# Patient Record
Sex: MALE | Race: WHITE | Employment: OTHER | URBAN - METROPOLITAN AREA
[De-identification: names, ages, dates, MRNs, and addresses within clinical notes are randomized per-mention and may not be internally consistent; named-entity substitution may affect disease eponyms.]

---

## 2017-02-02 ENCOUNTER — OFFICE VISIT (OUTPATIENT)
Dept: INTERNAL MEDICINE CLINIC | Age: 62
End: 2017-02-02

## 2017-02-02 VITALS
HEIGHT: 70 IN | WEIGHT: 173.6 LBS | BODY MASS INDEX: 24.85 KG/M2 | DIASTOLIC BLOOD PRESSURE: 82 MMHG | SYSTOLIC BLOOD PRESSURE: 134 MMHG | TEMPERATURE: 98.8 F | HEART RATE: 81 BPM

## 2017-02-02 DIAGNOSIS — E55.9 VITAMIN D DEFICIENCY: ICD-10-CM

## 2017-02-02 DIAGNOSIS — I10 HYPERTENSION, ESSENTIAL: Primary | ICD-10-CM

## 2017-02-02 DIAGNOSIS — Z11.4 SCREENING FOR HIV (HUMAN IMMUNODEFICIENCY VIRUS): ICD-10-CM

## 2017-02-02 DIAGNOSIS — F41.1 GAD (GENERALIZED ANXIETY DISORDER): ICD-10-CM

## 2017-02-02 DIAGNOSIS — E78.2 HYPERLIPIDEMIA, MIXED: ICD-10-CM

## 2017-02-02 DIAGNOSIS — K21.9 GASTROESOPHAGEAL REFLUX DISEASE WITHOUT ESOPHAGITIS: ICD-10-CM

## 2017-02-02 DIAGNOSIS — F51.04 INSOMNIA, PSYCHOPHYSIOLOGICAL: ICD-10-CM

## 2017-02-02 DIAGNOSIS — Z12.5 SCREENING FOR PROSTATE CANCER: ICD-10-CM

## 2017-02-02 DIAGNOSIS — Z12.11 COLON CANCER SCREENING: ICD-10-CM

## 2017-02-02 DIAGNOSIS — R53.83 FATIGUE, UNSPECIFIED TYPE: ICD-10-CM

## 2017-02-02 PROCEDURE — 99204 OFFICE O/P NEW MOD 45 MIN: CPT | Performed by: FAMILY MEDICINE

## 2017-02-02 RX ORDER — VENLAFAXINE HYDROCHLORIDE 37.5 MG/1
37.5 CAPSULE, EXTENDED RELEASE ORAL DAILY
Qty: 90 CAPSULE | Refills: 1 | Status: SHIPPED | OUTPATIENT
Start: 2017-02-02 | End: 2017-07-17 | Stop reason: SDUPTHER

## 2017-02-13 DIAGNOSIS — E55.9 VITAMIN D DEFICIENCY: ICD-10-CM

## 2017-02-13 DIAGNOSIS — Z12.5 SCREENING FOR PROSTATE CANCER: ICD-10-CM

## 2017-02-13 DIAGNOSIS — E78.2 HYPERLIPIDEMIA, MIXED: ICD-10-CM

## 2017-02-13 DIAGNOSIS — I10 HYPERTENSION, ESSENTIAL: ICD-10-CM

## 2017-02-13 DIAGNOSIS — R53.83 FATIGUE, UNSPECIFIED TYPE: ICD-10-CM

## 2017-02-13 DIAGNOSIS — Z11.4 SCREENING FOR HIV (HUMAN IMMUNODEFICIENCY VIRUS): ICD-10-CM

## 2017-03-15 PROBLEM — I10 HYPERTENSION, ESSENTIAL: Status: ACTIVE | Noted: 2017-03-15

## 2017-03-15 PROBLEM — F41.1 GAD (GENERALIZED ANXIETY DISORDER): Status: ACTIVE | Noted: 2017-03-15

## 2017-03-15 PROBLEM — E78.2 HYPERLIPIDEMIA, MIXED: Status: ACTIVE | Noted: 2017-03-15

## 2017-03-15 PROBLEM — F51.04 INSOMNIA, PSYCHOPHYSIOLOGICAL: Status: ACTIVE | Noted: 2017-03-15

## 2017-03-15 PROBLEM — K21.9 GASTROESOPHAGEAL REFLUX DISEASE WITHOUT ESOPHAGITIS: Status: ACTIVE | Noted: 2017-03-15

## 2017-03-15 RX ORDER — ZOLPIDEM TARTRATE 10 MG/1
10 TABLET ORAL NIGHTLY PRN
COMMUNITY
End: 2017-05-04 | Stop reason: SDUPTHER

## 2017-03-15 RX ORDER — DOXAZOSIN 8 MG/1
8 TABLET ORAL DAILY
COMMUNITY
End: 2017-03-17 | Stop reason: SDUPTHER

## 2017-03-15 RX ORDER — POTASSIUM CHLORIDE 750 MG/1
10 TABLET, EXTENDED RELEASE ORAL DAILY
COMMUNITY
End: 2017-04-04 | Stop reason: SDUPTHER

## 2017-03-15 ASSESSMENT — ENCOUNTER SYMPTOMS
BACK PAIN: 0
SORE THROAT: 0
EYE PAIN: 0
WHEEZING: 0
COLOR CHANGE: 0
SHORTNESS OF BREATH: 0
COUGH: 0
TROUBLE SWALLOWING: 0
CHEST TIGHTNESS: 0
NAUSEA: 0
CONSTIPATION: 0
EYE DISCHARGE: 0
EYE REDNESS: 0
RHINORRHEA: 0
ABDOMINAL PAIN: 0
VOMITING: 0
DIARRHEA: 0
SINUS PRESSURE: 0

## 2017-03-16 ENCOUNTER — TELEPHONE (OUTPATIENT)
Dept: INTERNAL MEDICINE CLINIC | Age: 62
End: 2017-03-16

## 2017-03-17 RX ORDER — DOXAZOSIN 8 MG/1
8 TABLET ORAL DAILY
Qty: 90 TABLET | Refills: 0 | Status: SHIPPED | OUTPATIENT
Start: 2017-03-17 | End: 2017-04-19 | Stop reason: SDUPTHER

## 2017-03-20 RX ORDER — ATENOLOL 50 MG/1
50 TABLET ORAL DAILY
Qty: 90 TABLET | Refills: 1 | Status: SHIPPED | OUTPATIENT
Start: 2017-03-20 | End: 2017-03-23 | Stop reason: CLARIF

## 2017-03-20 RX ORDER — ATORVASTATIN CALCIUM 40 MG/1
40 TABLET, FILM COATED ORAL DAILY
Qty: 90 TABLET | Refills: 1 | Status: SHIPPED | OUTPATIENT
Start: 2017-03-20 | End: 2017-06-20 | Stop reason: SDUPTHER

## 2017-03-22 ENCOUNTER — TELEPHONE (OUTPATIENT)
Dept: INTERNAL MEDICINE CLINIC | Age: 62
End: 2017-03-22

## 2017-03-23 RX ORDER — ATENOLOL AND CHLORTHALIDONE TABLET 50; 25 MG/1; MG/1
1 TABLET ORAL DAILY
Qty: 90 TABLET | Refills: 1 | Status: SHIPPED | OUTPATIENT
Start: 2017-03-23 | End: 2017-08-15 | Stop reason: ALTCHOICE

## 2017-04-04 ENCOUNTER — TELEPHONE (OUTPATIENT)
Dept: INTERNAL MEDICINE CLINIC | Age: 62
End: 2017-04-04

## 2017-04-04 RX ORDER — PANTOPRAZOLE SODIUM 40 MG/1
40 TABLET, DELAYED RELEASE ORAL DAILY
Qty: 90 TABLET | Refills: 1 | Status: SHIPPED | OUTPATIENT
Start: 2017-04-04 | End: 2017-11-11 | Stop reason: SDUPTHER

## 2017-04-04 RX ORDER — POTASSIUM CHLORIDE 750 MG/1
10 TABLET, EXTENDED RELEASE ORAL DAILY
Qty: 90 TABLET | Refills: 1 | Status: SHIPPED | OUTPATIENT
Start: 2017-04-04 | End: 2017-11-27 | Stop reason: SDUPTHER

## 2017-04-19 RX ORDER — DOXAZOSIN 8 MG/1
8 TABLET ORAL DAILY
Qty: 90 TABLET | Refills: 0 | Status: SHIPPED | OUTPATIENT
Start: 2017-04-19 | End: 2017-08-02 | Stop reason: SDUPTHER

## 2017-04-20 ENCOUNTER — TELEPHONE (OUTPATIENT)
Dept: INTERNAL MEDICINE CLINIC | Age: 62
End: 2017-04-20

## 2017-05-04 RX ORDER — ZOLPIDEM TARTRATE 10 MG/1
10 TABLET ORAL NIGHTLY PRN
Qty: 90 TABLET | Refills: 0 | Status: SHIPPED | OUTPATIENT
Start: 2017-05-04 | End: 2017-08-23 | Stop reason: SDUPTHER

## 2017-06-20 RX ORDER — ATORVASTATIN CALCIUM 40 MG/1
40 TABLET, FILM COATED ORAL DAILY
Qty: 90 TABLET | Refills: 0 | Status: ON HOLD | OUTPATIENT
Start: 2017-06-20 | End: 2017-07-01 | Stop reason: HOSPADM

## 2017-06-29 PROBLEM — E80.6 HYPERBILIRUBINEMIA: Status: ACTIVE | Noted: 2017-06-29

## 2017-07-03 ENCOUNTER — TELEPHONE (OUTPATIENT)
Dept: SURGERY | Age: 62
End: 2017-07-03

## 2017-07-06 ENCOUNTER — HOSPITAL ENCOUNTER (OUTPATIENT)
Dept: OTHER | Age: 62
Discharge: HOME | End: 2017-07-06
Attending: SURGERY | Admitting: SURGERY

## 2017-07-06 ENCOUNTER — CARE COORDINATION (OUTPATIENT)
Dept: CARE COORDINATION | Age: 62
End: 2017-07-06

## 2017-07-06 ENCOUNTER — TELEPHONE (OUTPATIENT)
Dept: SURGERY | Age: 62
End: 2017-07-06

## 2017-07-06 DIAGNOSIS — K86.2 PANCREATIC CYST: Primary | ICD-10-CM

## 2017-07-06 DIAGNOSIS — R17 JAUNDICE: ICD-10-CM

## 2017-07-06 LAB
A/G RATIO: 1.1 (ref 1.1–2.2)
ALBUMIN SERPL-MCNC: 3.9 G/DL (ref 3.4–5)
ALP BLD-CCNC: 704 U/L (ref 40–129)
ALT SERPL-CCNC: 175 U/L (ref 10–40)
ANION GAP SERPL CALCULATED.3IONS-SCNC: 18 MMOL/L (ref 3–16)
APTT: 34.6 SEC (ref 21–31.8)
AST SERPL-CCNC: 67 U/L (ref 15–37)
BASOPHILS ABSOLUTE: 0.1 K/UL (ref 0–0.2)
BASOPHILS RELATIVE PERCENT: 1 %
BILIRUB SERPL-MCNC: 9.2 MG/DL (ref 0–1)
BUN BLDV-MCNC: 27 MG/DL (ref 7–20)
CALCIUM SERPL-MCNC: 10.1 MG/DL (ref 8.3–10.6)
CHLORIDE BLD-SCNC: 96 MMOL/L (ref 99–110)
CO2: 21 MMOL/L (ref 21–32)
CREAT SERPL-MCNC: 1.1 MG/DL (ref 0.9–1.3)
EOSINOPHILS ABSOLUTE: 0.2 K/UL (ref 0–0.6)
EOSINOPHILS RELATIVE PERCENT: 2.7 %
GFR AFRICAN AMERICAN: >60
GFR NON-AFRICAN AMERICAN: >60
GLOBULIN: 3.5 G/DL
GLUCOSE BLD-MCNC: 103 MG/DL (ref 70–99)
HCT VFR BLD CALC: 37.1 % (ref 40.5–52.5)
HEMOGLOBIN: 12.6 G/DL (ref 13.5–17.5)
INR BLD: 1.18 (ref 0.85–1.15)
LYMPHOCYTES ABSOLUTE: 1.4 K/UL (ref 1–5.1)
LYMPHOCYTES RELATIVE PERCENT: 17.7 %
MCH RBC QN AUTO: 35 PG (ref 26–34)
MCHC RBC AUTO-ENTMCNC: 33.9 G/DL (ref 31–36)
MCV RBC AUTO: 103.1 FL (ref 80–100)
MONOCYTES ABSOLUTE: 0.9 K/UL (ref 0–1.3)
MONOCYTES RELATIVE PERCENT: 11.4 %
NEUTROPHILS ABSOLUTE: 5.3 K/UL (ref 1.7–7.7)
NEUTROPHILS RELATIVE PERCENT: 67.2 %
PDW BLD-RTO: 15.2 % (ref 12.4–15.4)
PLATELET # BLD: 385 K/UL (ref 135–450)
PMV BLD AUTO: 9.3 FL (ref 5–10.5)
POTASSIUM SERPL-SCNC: 3.3 MMOL/L (ref 3.5–5.1)
PROTHROMBIN TIME: 13.3 SEC (ref 9.6–13)
RBC # BLD: 3.6 M/UL (ref 4.2–5.9)
SODIUM BLD-SCNC: 135 MMOL/L (ref 136–145)
TOTAL PROTEIN: 7.4 G/DL (ref 6.4–8.2)
WBC # BLD: 7.9 K/UL (ref 4–11)

## 2017-07-07 ENCOUNTER — OFFICE VISIT (OUTPATIENT)
Dept: SURGERY | Age: 62
End: 2017-07-07

## 2017-07-07 ENCOUNTER — TELEPHONE (OUTPATIENT)
Dept: SURGERY | Age: 62
End: 2017-07-07

## 2017-07-07 VITALS
TEMPERATURE: 98.2 F | DIASTOLIC BLOOD PRESSURE: 73 MMHG | HEIGHT: 70 IN | HEART RATE: 74 BPM | BODY MASS INDEX: 23.48 KG/M2 | WEIGHT: 164 LBS | SYSTOLIC BLOOD PRESSURE: 115 MMHG | OXYGEN SATURATION: 98 %

## 2017-07-07 DIAGNOSIS — K83.1 OBSTRUCTIVE JAUNDICE: ICD-10-CM

## 2017-07-07 DIAGNOSIS — K86.89 PANCREATIC MASS: Primary | ICD-10-CM

## 2017-07-07 DIAGNOSIS — R16.0 LIVER MASS: ICD-10-CM

## 2017-07-07 PROCEDURE — 99245 OFF/OP CONSLTJ NEW/EST HI 55: CPT | Performed by: SURGERY

## 2017-07-07 RX ORDER — CETIRIZINE HYDROCHLORIDE 10 MG/1
10 TABLET ORAL DAILY
Qty: 30 TABLET | Refills: 0 | Status: SHIPPED | OUTPATIENT
Start: 2017-07-07 | End: 2017-10-27

## 2017-07-10 ENCOUNTER — HOSPITAL ENCOUNTER (OUTPATIENT)
Dept: SURGERY | Age: 62
Discharge: HOME | End: 2017-07-10
Attending: INTERNAL MEDICINE | Admitting: INTERNAL MEDICINE

## 2017-07-10 VITALS
HEART RATE: 54 BPM | RESPIRATION RATE: 14 BRPM | SYSTOLIC BLOOD PRESSURE: 114 MMHG | DIASTOLIC BLOOD PRESSURE: 70 MMHG | HEIGHT: 70 IN | BODY MASS INDEX: 23.48 KG/M2 | TEMPERATURE: 97.9 F | WEIGHT: 164 LBS | OXYGEN SATURATION: 97 %

## 2017-07-10 DIAGNOSIS — K31.89 DUODENAL MASS: ICD-10-CM

## 2017-07-10 RX ORDER — SODIUM CHLORIDE, SODIUM LACTATE, POTASSIUM CHLORIDE, CALCIUM CHLORIDE 600; 310; 30; 20 MG/100ML; MG/100ML; MG/100ML; MG/100ML
INJECTION, SOLUTION INTRAVENOUS CONTINUOUS
Status: DISCONTINUED | OUTPATIENT
Start: 2017-07-10 | End: 2017-07-11 | Stop reason: HOSPADM

## 2017-07-10 ASSESSMENT — PAIN DESCRIPTION - DESCRIPTORS: DESCRIPTORS: ACHING

## 2017-07-10 ASSESSMENT — PAIN - FUNCTIONAL ASSESSMENT: PAIN_FUNCTIONAL_ASSESSMENT: 0-10

## 2017-07-12 ENCOUNTER — TELEPHONE (OUTPATIENT)
Dept: SURGERY | Age: 62
End: 2017-07-12

## 2017-07-13 ENCOUNTER — TELEPHONE (OUTPATIENT)
Dept: SURGERY | Age: 62
End: 2017-07-13

## 2017-07-14 ENCOUNTER — HOSPITAL ENCOUNTER (OUTPATIENT)
Dept: OTHER | Age: 62
Discharge: HOME | End: 2017-07-14
Attending: SURGERY | Admitting: SURGERY

## 2017-07-14 ENCOUNTER — TELEPHONE (OUTPATIENT)
Dept: SURGERY | Age: 62
End: 2017-07-14

## 2017-07-14 VITALS — WEIGHT: 164 LBS | HEIGHT: 71 IN | BODY MASS INDEX: 22.96 KG/M2

## 2017-07-14 DIAGNOSIS — K83.1 OBSTRUCTIVE JAUNDICE: ICD-10-CM

## 2017-07-14 DIAGNOSIS — R16.0 LIVER MASS: ICD-10-CM

## 2017-07-14 DIAGNOSIS — K86.89 PANCREATIC MASS: ICD-10-CM

## 2017-07-14 RX ORDER — FLUDEOXYGLUCOSE F 18 200 MCI/ML
13.98 INJECTION, SOLUTION INTRAVENOUS
Status: COMPLETED | OUTPATIENT
Start: 2017-07-14 | End: 2017-07-14

## 2017-07-14 RX ADMIN — FLUDEOXYGLUCOSE F 18 13.98 MILLICURIE: 200 INJECTION, SOLUTION INTRAVENOUS at 10:21

## 2017-07-17 ENCOUNTER — TELEPHONE (OUTPATIENT)
Dept: SURGERY | Age: 62
End: 2017-07-17

## 2017-07-17 PROBLEM — C25.9 PANCREATIC CANCER (HCC): Status: ACTIVE | Noted: 2017-07-17

## 2017-07-18 ENCOUNTER — TELEPHONE (OUTPATIENT)
Dept: SURGERY | Age: 62
End: 2017-07-18

## 2017-07-18 ENCOUNTER — TELEPHONE (OUTPATIENT)
Dept: INTERNAL MEDICINE CLINIC | Age: 62
End: 2017-07-18

## 2017-07-18 ENCOUNTER — HOSPITAL ENCOUNTER (OUTPATIENT)
Dept: PREADMISSION TESTING | Age: 62
Discharge: HOME | End: 2017-07-18
Attending: SURGERY | Admitting: SURGERY

## 2017-07-18 VITALS — WEIGHT: 160 LBS | HEIGHT: 71 IN | BODY MASS INDEX: 22.4 KG/M2

## 2017-07-18 RX ORDER — CHLORHEXIDINE GLUCONATE 0.12 MG/ML
15 RINSE ORAL 2 TIMES DAILY
Status: CANCELLED | OUTPATIENT
Start: 2017-07-18

## 2017-07-18 RX ORDER — MAGNESIUM GLUCONATE 27 MG(500)
500 TABLET ORAL NIGHTLY
COMMUNITY
End: 2018-08-30 | Stop reason: ALTCHOICE

## 2017-07-18 RX ORDER — DIPHENHYDRAMINE HCL 50 MG
50 CAPSULE ORAL EVERY 4 HOURS
COMMUNITY
End: 2017-10-27

## 2017-07-18 RX ORDER — MONTELUKAST SODIUM 4 MG/1
1 TABLET, CHEWABLE ORAL 2 TIMES DAILY
COMMUNITY
End: 2017-11-10 | Stop reason: ALTCHOICE

## 2017-07-18 RX ORDER — VITAMIN B COMPLEX
1 CAPSULE ORAL NIGHTLY
Status: ON HOLD | COMMUNITY
End: 2018-12-11

## 2017-07-18 ASSESSMENT — PAIN DESCRIPTION - LOCATION: LOCATION: ABDOMEN

## 2017-07-18 ASSESSMENT — PAIN DESCRIPTION - DESCRIPTORS: DESCRIPTORS: PRESSURE;CRAMPING

## 2017-07-18 ASSESSMENT — PAIN SCALES - GENERAL: PAINLEVEL_OUTOF10: 3

## 2017-07-19 ENCOUNTER — TELEPHONE (OUTPATIENT)
Dept: SURGERY | Age: 62
End: 2017-07-19

## 2017-07-19 ENCOUNTER — HOSPITAL ENCOUNTER (OUTPATIENT)
Dept: SURGERY | Age: 62
Discharge: HOME | End: 2017-07-19
Admitting: SURGERY

## 2017-07-19 VITALS
SYSTOLIC BLOOD PRESSURE: 128 MMHG | RESPIRATION RATE: 16 BRPM | HEIGHT: 71 IN | HEART RATE: 81 BPM | WEIGHT: 160 LBS | DIASTOLIC BLOOD PRESSURE: 80 MMHG | OXYGEN SATURATION: 97 % | BODY MASS INDEX: 22.4 KG/M2 | TEMPERATURE: 97.9 F

## 2017-07-19 DIAGNOSIS — C25.9 MALIGNANT NEOPLASM OF PANCREAS, UNSPECIFIED LOCATION OF MALIGNANCY (HCC): ICD-10-CM

## 2017-07-19 LAB
A/G RATIO: 1 (ref 1.1–2.2)
ABO/RH: NORMAL
ALBUMIN SERPL-MCNC: 4 G/DL (ref 3.4–5)
ALP BLD-CCNC: 447 U/L (ref 40–129)
ALT SERPL-CCNC: 229 U/L (ref 10–40)
ANION GAP SERPL CALCULATED.3IONS-SCNC: 17 MMOL/L (ref 3–16)
ANTIBODY SCREEN: NORMAL
AST SERPL-CCNC: 143 U/L (ref 15–37)
BILIRUB SERPL-MCNC: 4.8 MG/DL (ref 0–1)
BUN BLDV-MCNC: 17 MG/DL (ref 7–20)
CALCIUM SERPL-MCNC: 10.5 MG/DL (ref 8.3–10.6)
CHLORIDE BLD-SCNC: 93 MMOL/L (ref 99–110)
CO2: 24 MMOL/L (ref 21–32)
CREAT SERPL-MCNC: 1.1 MG/DL (ref 0.9–1.3)
GFR AFRICAN AMERICAN: >60
GFR NON-AFRICAN AMERICAN: >60
GLOBULIN: 3.9 G/DL
GLUCOSE BLD-MCNC: 123 MG/DL (ref 70–99)
HCT VFR BLD CALC: 32 % (ref 40.5–52.5)
HEMOGLOBIN: 11 G/DL (ref 13.5–17.5)
INR BLD: 1.04 (ref 0.85–1.15)
MCH RBC QN AUTO: 34.5 PG (ref 26–34)
MCHC RBC AUTO-ENTMCNC: 34.5 G/DL (ref 31–36)
MCV RBC AUTO: 100.1 FL (ref 80–100)
PDW BLD-RTO: 13.8 % (ref 12.4–15.4)
PLATELET # BLD: 416 K/UL (ref 135–450)
PMV BLD AUTO: 8.6 FL (ref 5–10.5)
POTASSIUM SERPL-SCNC: 3 MMOL/L (ref 3.5–5.1)
PROTHROMBIN TIME: 11.7 SEC (ref 9.6–13)
RBC # BLD: 3.2 M/UL (ref 4.2–5.9)
SODIUM BLD-SCNC: 134 MMOL/L (ref 136–145)
TOTAL PROTEIN: 7.9 G/DL (ref 6.4–8.2)
WBC # BLD: 11.3 K/UL (ref 4–11)

## 2017-07-19 PROCEDURE — 36561 INSERT TUNNELED CV CATH: CPT | Performed by: SURGERY

## 2017-07-19 PROCEDURE — 49320 DIAG LAPARO SEPARATE PROC: CPT | Performed by: SURGERY

## 2017-07-19 PROCEDURE — 76998 US GUIDE INTRAOP: CPT | Performed by: SURGERY

## 2017-07-19 PROCEDURE — 77001 FLUOROGUIDE FOR VEIN DEVICE: CPT | Performed by: SURGERY

## 2017-07-19 RX ORDER — MEPERIDINE HYDROCHLORIDE 25 MG/ML
12.5 INJECTION INTRAMUSCULAR; INTRAVENOUS; SUBCUTANEOUS EVERY 5 MIN PRN
Status: DISCONTINUED | OUTPATIENT
Start: 2017-07-19 | End: 2017-07-20 | Stop reason: HOSPADM

## 2017-07-19 RX ORDER — OXYCODONE HYDROCHLORIDE AND ACETAMINOPHEN 5; 325 MG/1; MG/1
1 TABLET ORAL EVERY 6 HOURS PRN
Qty: 25 TABLET | Refills: 0 | Status: SHIPPED | OUTPATIENT
Start: 2017-07-19 | End: 2017-07-26

## 2017-07-19 RX ORDER — FENTANYL CITRATE 50 UG/ML
25 INJECTION, SOLUTION INTRAMUSCULAR; INTRAVENOUS EVERY 5 MIN PRN
Status: DISCONTINUED | OUTPATIENT
Start: 2017-07-19 | End: 2017-07-20 | Stop reason: HOSPADM

## 2017-07-19 RX ORDER — GLYCOPYRROLATE 0.2 MG/ML
0.1 INJECTION INTRAMUSCULAR; INTRAVENOUS ONCE
Status: COMPLETED | OUTPATIENT
Start: 2017-07-19 | End: 2017-07-19

## 2017-07-19 RX ORDER — SODIUM CHLORIDE, SODIUM LACTATE, POTASSIUM CHLORIDE, CALCIUM CHLORIDE 600; 310; 30; 20 MG/100ML; MG/100ML; MG/100ML; MG/100ML
INJECTION, SOLUTION INTRAVENOUS CONTINUOUS
Status: DISCONTINUED | OUTPATIENT
Start: 2017-07-19 | End: 2017-07-20 | Stop reason: HOSPADM

## 2017-07-19 RX ORDER — PROMETHAZINE HYDROCHLORIDE 25 MG/ML
6.25 INJECTION, SOLUTION INTRAMUSCULAR; INTRAVENOUS
Status: ACTIVE | OUTPATIENT
Start: 2017-07-19 | End: 2017-07-19

## 2017-07-19 RX ORDER — PROMETHAZINE HYDROCHLORIDE 25 MG/1
25 TABLET ORAL EVERY 6 HOURS PRN
COMMUNITY
End: 2017-10-27

## 2017-07-19 RX ORDER — CHLORHEXIDINE GLUCONATE 0.12 MG/ML
15 RINSE ORAL 2 TIMES DAILY
Status: DISCONTINUED | OUTPATIENT
Start: 2017-07-19 | End: 2017-07-19

## 2017-07-19 RX ORDER — ONDANSETRON 2 MG/ML
4 INJECTION INTRAMUSCULAR; INTRAVENOUS
Status: ACTIVE | OUTPATIENT
Start: 2017-07-19 | End: 2017-07-19

## 2017-07-19 RX ORDER — LABETALOL HYDROCHLORIDE 5 MG/ML
5 INJECTION, SOLUTION INTRAVENOUS EVERY 10 MIN PRN
Status: DISCONTINUED | OUTPATIENT
Start: 2017-07-19 | End: 2017-07-20 | Stop reason: HOSPADM

## 2017-07-19 RX ORDER — DIPHENHYDRAMINE HYDROCHLORIDE 50 MG/ML
12.5 INJECTION INTRAMUSCULAR; INTRAVENOUS
Status: ACTIVE | OUTPATIENT
Start: 2017-07-19 | End: 2017-07-19

## 2017-07-19 RX ORDER — OXYCODONE HYDROCHLORIDE AND ACETAMINOPHEN 5; 325 MG/1; MG/1
1 TABLET ORAL ONCE
Status: DISCONTINUED | OUTPATIENT
Start: 2017-07-19 | End: 2017-07-20 | Stop reason: HOSPADM

## 2017-07-19 RX ORDER — HYDRALAZINE HYDROCHLORIDE 20 MG/ML
5 INJECTION INTRAMUSCULAR; INTRAVENOUS EVERY 10 MIN PRN
Status: DISCONTINUED | OUTPATIENT
Start: 2017-07-19 | End: 2017-07-20 | Stop reason: HOSPADM

## 2017-07-19 RX ORDER — MIDAZOLAM HYDROCHLORIDE 1 MG/ML
INJECTION INTRAMUSCULAR; INTRAVENOUS
Status: COMPLETED
Start: 2017-07-19 | End: 2017-07-19

## 2017-07-19 RX ORDER — MIDAZOLAM HYDROCHLORIDE 1 MG/ML
2 INJECTION INTRAMUSCULAR; INTRAVENOUS ONCE
Status: COMPLETED | OUTPATIENT
Start: 2017-07-19 | End: 2017-07-19

## 2017-07-19 RX ADMIN — SODIUM CHLORIDE, SODIUM LACTATE, POTASSIUM CHLORIDE, CALCIUM CHLORIDE: 600; 310; 30; 20 INJECTION, SOLUTION INTRAVENOUS at 13:30

## 2017-07-19 RX ADMIN — GLYCOPYRROLATE 0.1 MG: 0.2 INJECTION INTRAMUSCULAR; INTRAVENOUS at 13:54

## 2017-07-19 RX ADMIN — FENTANYL CITRATE 25 MCG: 50 INJECTION, SOLUTION INTRAMUSCULAR; INTRAVENOUS at 16:09

## 2017-07-19 RX ADMIN — FENTANYL CITRATE 25 MCG: 50 INJECTION, SOLUTION INTRAMUSCULAR; INTRAVENOUS at 16:18

## 2017-07-19 RX ADMIN — MIDAZOLAM HYDROCHLORIDE 2 MG: 1 INJECTION INTRAMUSCULAR; INTRAVENOUS at 13:52

## 2017-07-19 ASSESSMENT — PAIN SCALES - GENERAL
PAINLEVEL_OUTOF10: 4
PAINLEVEL_OUTOF10: 2
PAINLEVEL_OUTOF10: 5
PAINLEVEL_OUTOF10: 1
PAINLEVEL_OUTOF10: 5

## 2017-07-19 ASSESSMENT — PAIN DESCRIPTION - PAIN TYPE: TYPE: SURGICAL PAIN

## 2017-07-19 ASSESSMENT — PAIN - FUNCTIONAL ASSESSMENT: PAIN_FUNCTIONAL_ASSESSMENT: 0-10

## 2017-07-19 ASSESSMENT — PAIN DESCRIPTION - LOCATION: LOCATION: ABDOMEN

## 2017-07-19 ASSESSMENT — PAIN DESCRIPTION - DESCRIPTORS: DESCRIPTORS: BURNING

## 2017-08-02 RX ORDER — DOXAZOSIN 8 MG/1
TABLET ORAL
Qty: 90 TABLET | Refills: 0 | Status: SHIPPED | OUTPATIENT
Start: 2017-08-02 | End: 2018-02-08 | Stop reason: SDUPTHER

## 2017-08-15 PROBLEM — C25.0 MALIGNANT NEOPLASM OF HEAD OF PANCREAS (HCC): Status: ACTIVE | Noted: 2017-08-15

## 2017-09-10 DIAGNOSIS — F41.1 GAD (GENERALIZED ANXIETY DISORDER): ICD-10-CM

## 2017-09-11 DIAGNOSIS — F41.1 GAD (GENERALIZED ANXIETY DISORDER): ICD-10-CM

## 2017-09-11 DIAGNOSIS — C25.0 MALIGNANT NEOPLASM OF HEAD OF PANCREAS (HCC): ICD-10-CM

## 2017-09-11 RX ORDER — VENLAFAXINE HYDROCHLORIDE 37.5 MG/1
CAPSULE, EXTENDED RELEASE ORAL
Qty: 90 CAPSULE | Refills: 0 | OUTPATIENT
Start: 2017-09-11

## 2017-09-11 RX ORDER — VENLAFAXINE HYDROCHLORIDE 75 MG/1
75 CAPSULE, EXTENDED RELEASE ORAL DAILY
Qty: 90 CAPSULE | Refills: 1 | Status: SHIPPED | OUTPATIENT
Start: 2017-09-11 | End: 2018-01-10 | Stop reason: SDUPTHER

## 2017-09-26 ENCOUNTER — HOSPITAL ENCOUNTER (OUTPATIENT)
Dept: ONCOLOGY | Age: 62
Discharge: HOME | End: 2017-09-30
Attending: INTERNAL MEDICINE | Admitting: INTERNAL MEDICINE

## 2017-09-28 LAB — MISCELLANEOUS LAB TEST ORDER: ABNORMAL

## 2017-10-02 ENCOUNTER — OFFICE VISIT (OUTPATIENT)
Dept: DERMATOLOGY | Age: 62
End: 2017-10-02

## 2017-10-02 DIAGNOSIS — L82.0 SEBORRHEIC KERATOSES, INFLAMED: ICD-10-CM

## 2017-10-02 DIAGNOSIS — D48.5 NEOPLASM OF UNCERTAIN BEHAVIOR OF SKIN: Primary | ICD-10-CM

## 2017-10-02 DIAGNOSIS — L82.1 SEBORRHEIC KERATOSES: ICD-10-CM

## 2017-10-02 DIAGNOSIS — D22.9 BENIGN NEVUS: ICD-10-CM

## 2017-10-02 PROCEDURE — 11100 PR BIOPSY OF SKIN LESION: CPT | Performed by: DERMATOLOGY

## 2017-10-02 PROCEDURE — 99202 OFFICE O/P NEW SF 15 MIN: CPT | Performed by: DERMATOLOGY

## 2017-10-02 PROCEDURE — 17110 DESTRUCTION B9 LES UP TO 14: CPT | Performed by: DERMATOLOGY

## 2017-10-02 NOTE — PROGRESS NOTES
[facility]) Dermatology  Lois Tubbs M.D.  [phone]       Pallavi Moreno  1955    63 y.o. male     Date of Visit: 10/2/2017    Chief Complaint:   Chief Complaint   Patient presents with    New Patient     currently being treated for Pancreatic cancer     Skin Exam     total body        I was asked to see this patient by Dr. Dixon ref. provider found. History of Present Illness: 1. Total body skin exam    No previous personal or family history of skin cancer    Increasing number of seborrheic keratoses. Patient bothered by lesions on temples. Increasing in size and number. Has not noticed any new or changing pigmented lesions. Review of Systems:  Constitutional: Reports general sense of well-being       Past Medical History, Surgical History, Family History, Medications and Allergies reviewed. Social History:   Social History     Social History    Marital status:      Spouse name: N/A    Number of children: N/A    Years of education: N/A     Occupational History    Not on file. Social History Main Topics    Smoking status: Never Smoker    Smokeless tobacco: Never Used    Alcohol use 3.5 oz/week     7 Standard drinks or equivalent per week      Comment: daily    Drug use: No    Sexual activity: Not on file     Other Topics Concern    Not on file     Social History Narrative       Physical Examination       -General: Well-appearing, NAD  1. Normal affect. Total body skin exam including scalp, face, neck, chest, abdomen, back, bilateral upper extremities, bilateral lower extremities, ocular conjunctiva, external lips, and nails was performed. Underwear area not examined. Scattered on the trunk and extremities are multiple well-defined round and oval symmetric smoothly-bordered uniformly brown macules and papules. Multiple hyperkeratotic stuck on papules and plaques over his torso and temples.   Left lateral torso 4 mm dark brown well-demarcated papule-rule out

## 2017-10-05 ENCOUNTER — TELEPHONE (OUTPATIENT)
Dept: DERMATOLOGY | Age: 62
End: 2017-10-05

## 2017-10-06 ENCOUNTER — HOSPITAL ENCOUNTER (OUTPATIENT)
Dept: CT IMAGING | Age: 62
Discharge: HOME | End: 2017-10-06

## 2017-10-06 DIAGNOSIS — C25.9 MALIGNANT NEOPLASM OF PANCREAS, UNSPECIFIED LOCATION OF MALIGNANCY (HCC): ICD-10-CM

## 2017-10-10 ENCOUNTER — TELEPHONE (OUTPATIENT)
Dept: DERMATOLOGY | Age: 62
End: 2017-10-10

## 2017-10-10 NOTE — TELEPHONE ENCOUNTER
Called PT N/A L/M w/results of the biopsy. I advised to call w/any questions/concerns. Date of biopsy: 10/2/2017    Site of biopsy: LT lateral torso    Result: Compound Nevus    Plan: No treatment needed.

## 2017-10-20 PROBLEM — K56.609 SBO (SMALL BOWEL OBSTRUCTION) (HCC): Status: ACTIVE | Noted: 2017-10-20

## 2017-10-24 ENCOUNTER — TELEPHONE (OUTPATIENT)
Dept: SURGERY | Age: 62
End: 2017-10-24

## 2017-10-25 ENCOUNTER — TELEPHONE (OUTPATIENT)
Dept: SURGERY | Age: 62
End: 2017-10-25

## 2017-10-25 ENCOUNTER — TELEPHONE (OUTPATIENT)
Dept: INTERNAL MEDICINE CLINIC | Age: 62
End: 2017-10-25

## 2017-10-25 NOTE — TELEPHONE ENCOUNTER
Please call patient's spouse Jolynn Villar) states Dr. Jhonatan Coffman  told him to speak to Thyra Dubin Dr. Hosey Dago nurse. Please call Sandro Craft @ [phone].

## 2017-10-27 ENCOUNTER — HOSPITAL ENCOUNTER (OUTPATIENT)
Dept: PREADMISSION TESTING | Age: 62
Discharge: HOME | End: 2017-10-27
Attending: SURGERY | Admitting: SURGERY

## 2017-10-27 ENCOUNTER — TELEPHONE (OUTPATIENT)
Dept: SURGERY | Age: 62
End: 2017-10-27

## 2017-10-27 ENCOUNTER — OFFICE VISIT (OUTPATIENT)
Dept: SURGERY | Age: 62
End: 2017-10-27

## 2017-10-27 ENCOUNTER — TELEPHONE (OUTPATIENT)
Dept: INTERNAL MEDICINE CLINIC | Age: 62
End: 2017-10-27

## 2017-10-27 VITALS
HEART RATE: 86 BPM | BODY MASS INDEX: 20.52 KG/M2 | SYSTOLIC BLOOD PRESSURE: 147 MMHG | DIASTOLIC BLOOD PRESSURE: 99 MMHG | OXYGEN SATURATION: 100 % | WEIGHT: 143 LBS

## 2017-10-27 VITALS
TEMPERATURE: 96.6 F | HEART RATE: 67 BPM | DIASTOLIC BLOOD PRESSURE: 88 MMHG | BODY MASS INDEX: 20.33 KG/M2 | RESPIRATION RATE: 14 BRPM | SYSTOLIC BLOOD PRESSURE: 139 MMHG | HEIGHT: 70 IN | OXYGEN SATURATION: 100 % | WEIGHT: 142 LBS

## 2017-10-27 DIAGNOSIS — C25.0 MALIGNANT NEOPLASM OF HEAD OF PANCREAS (HCC): Primary | ICD-10-CM

## 2017-10-27 DIAGNOSIS — R16.0 LIVER MASS: ICD-10-CM

## 2017-10-27 LAB
A/G RATIO: 1.1 (ref 1.1–2.2)
ABO/RH: NORMAL
ALBUMIN SERPL-MCNC: 4.2 G/DL (ref 3.4–5)
ALP BLD-CCNC: 210 U/L (ref 40–129)
ALT SERPL-CCNC: 55 U/L (ref 10–40)
ANION GAP SERPL CALCULATED.3IONS-SCNC: 14 MMOL/L (ref 3–16)
ANTIBODY SCREEN: NORMAL
APTT: 31.6 SEC (ref 24.1–34.9)
AST SERPL-CCNC: 42 U/L (ref 15–37)
BILIRUB SERPL-MCNC: <0.2 MG/DL (ref 0–1)
BUN BLDV-MCNC: 27 MG/DL (ref 7–20)
CALCIUM SERPL-MCNC: 9.9 MG/DL (ref 8.3–10.6)
CHLORIDE BLD-SCNC: 97 MMOL/L (ref 99–110)
CO2: 28 MMOL/L (ref 21–32)
CREAT SERPL-MCNC: 0.8 MG/DL (ref 0.8–1.3)
EKG ATRIAL RATE: 66 BPM
EKG DIAGNOSIS: NORMAL
EKG P AXIS: 70 DEGREES
EKG P-R INTERVAL: 164 MS
EKG Q-T INTERVAL: 382 MS
EKG QRS DURATION: 94 MS
EKG QTC CALCULATION (BAZETT): 400 MS
EKG R AXIS: 16 DEGREES
EKG T AXIS: 52 DEGREES
EKG VENTRICULAR RATE: 66 BPM
GFR AFRICAN AMERICAN: >60
GFR NON-AFRICAN AMERICAN: >60
GLOBULIN: 3.9 G/DL
GLUCOSE BLD-MCNC: 136 MG/DL (ref 70–99)
HCT VFR BLD CALC: 36.6 % (ref 40.5–52.5)
HEMOGLOBIN: 12.3 G/DL (ref 13.5–17.5)
INR BLD: 1.08 (ref 0.85–1.15)
MCH RBC QN AUTO: 32.4 PG (ref 26–34)
MCHC RBC AUTO-ENTMCNC: 33.6 G/DL (ref 31–36)
MCV RBC AUTO: 96.7 FL (ref 80–100)
PDW BLD-RTO: 16.8 % (ref 12.4–15.4)
PLATELET # BLD: 255 K/UL (ref 135–450)
PMV BLD AUTO: 8 FL (ref 5–10.5)
POTASSIUM SERPL-SCNC: 3.8 MMOL/L (ref 3.5–5.1)
PROTHROMBIN TIME: 12.2 SEC (ref 9.6–13)
RBC # BLD: 3.79 M/UL (ref 4.2–5.9)
SODIUM BLD-SCNC: 139 MMOL/L (ref 136–145)
TOTAL PROTEIN: 8.1 G/DL (ref 6.4–8.2)
WBC # BLD: 4.6 K/UL (ref 4–11)

## 2017-10-27 PROCEDURE — 99215 OFFICE O/P EST HI 40 MIN: CPT | Performed by: SURGERY

## 2017-10-27 PROCEDURE — 93010 ELECTROCARDIOGRAM REPORT: CPT | Performed by: INTERNAL MEDICINE

## 2017-10-27 NOTE — PRE-PROCEDURE INSTRUCTIONS
[email or web address]                          Date of Procedure 11/1 Time of Procedure 1000    PRIOR TO PROCEDURE DATE:  1. Please follow any guidelines/instructions prior to your procedure as advised by your surgeon. 2. Arrange for someone to drive you home and be with you for the first 24 hours after discharge for your safety after your procedure for which you received sedation. Ensure it is someone we can share information with regarding your discharge. 3. You must contact your surgeon for instructions IF:   You are taking any blood thinners, aspirin, anti-inflammatory or vitamin E.   There is a change in your physical condition such as a cold, fever, rash, cuts, sores or any other infection, especially near your surgical site. 4. Do not drink alcohol the day before or day of your procedure. 5. A Pre-op History and Physical for surgery MUST be completed by your Physician or Urgent Care within 30 days of your procedure date. Please bring a copy with you on the day of your procedure and along with any other testing performed. THE DAY OF YOUR PROCEDURE:  1. Follow instructions for ARRIVAL TIME as DIRECTED BY YOUR SURGEON. If your surgeon does not give you a specific arrival time, please arrive at  0700.    2. Enter the MAIN entrance from [address] [place] [address] and follow the signs to the free [employer] or [employer] parking (offered free of charge 6am-5pm). 3. Enter the Main Entrance of the hospital (do NOT enter from the lower level of the parking garage). Upon entrance, check in with the  at the main desk on your left. If no one is available at the desk, proceed into the [facility] Waiting Room and go through the door directly into the [facility]. There is a Check-in desk ACROSS from Room 5 (marked with a sign hanging from the ceiling).  The phone number for the surgery center is [phone].    4. DO NOT EAT OR DRINK ANYTHING AFTER MIDNIGHT (exception would be medication instructions below only)     5. MEDICATIONS    Take the following medications with a SMALL sip of water: TAKE PANTOPRAZOLE. MAY HAVE PERCOCET, ZOFRAN, VENLAFAXINE. SCOP PATCH FINE. NOTIFY NURSE OF WHERE PATCH IS DAY OF SURGERY.  Use your usual dose of inhalers the morning of surgery. BRING your rescue inhaler with you to hospital.    Anesthesia does NOT want you to take insulin the morning of surgery. They will control your blood sugar while you are at the hospital. Please contact your ordering physician for instructions regarding your insulin the night before your procedure. If you have an insulin pump, please keep it set on basal rate. 6. Do not swallow water when brushing teeth. No gum, candy, mints or ice chips. Refrain from smoking or at least decrease the amount. 7. Dress in loose, comfortable clothing appropriate for redressing after your procedure. Do not wear jewelry (including body piercings), make-up (especially NO eye make-up), fingernail polish (NO toenail polish if foot/leg surgery), lotion, powders or metal hairclips. 8. Dentures, glasses, or contacts will need to be removed before your procedure. Bring cases for your glasses, contacts, dentures, or hearing aids to protect them while you are in surgery. 9. If you use a CPAP, please bring it with you on the day of your procedure. 10. We recommend that valuable personal  belongings, such as credit cards, cash, cell phones, e-tablets or jewelry, be left at home during your stay. The hospital will not be responsible for valuables that are not secured in the hospital safe. However, if your insurance requires a co-pay, you may want to bring a method of payment, i.e. Check or credit card, if you wish to pay your co-pay the day of surgery. 11. If you are to stay overnight, you may bring a bag with personal items.  Please have any large items you may need brought in by your family after your arrival

## 2017-10-27 NOTE — TELEPHONE ENCOUNTER
Jaylyn Sam @ [phone] from [facility], patient was recently discharged from the hospital with TPN until surgery and had to switch home care agencies due to insurance. ( Had alternate solutions) Just an FYI ,She is faxing TPN orders for  to sign.

## 2017-10-30 NOTE — PROGRESS NOTES
[facility] Surgical Oncology  Reynaldo Manuel  1955    HPI: Mr. Fede Gonzalez is here to discuss further management of Pancreatic cancer. He was recently in the hospital with duodenal obstruction. Attempts at stent placement were unsuccessful and had a PEG tube placed. He is on TPN currently. Able to take some clear liquids and requiring intermittent drainage through PEG tube. No new abdominal pain. Otherwise states that he is in good spirits. Patient is here today with his wife and uncle and aunt. During hospitalization, we discussed about multiple further management options including definitive surgery versus palliative surgery to bypass duodenal obstruction. Patient states that he thought about all options and discussed with Dr. Jl Bowman and wants to proceed with definitive surgery. He wants to know further details about surgery and postoperative course to be expected.     Past Medical History:   Diagnosis Date    Anxiety     Arthritis     back and toe    Depression     GERD (gastroesophageal reflux disease)     Hyperlipidemia     Hypertension     Pancreatic cancer ([facility] [address] 2017     Past Surgical History:   Procedure Laterality Date    ACHILLES TENDON SURGERY Right  (approximate)    APPENDECTOMY  7 29 11    CHOLECYSTECTOMY, LAPAROSCOPIC  7 34 11    COLONOSCOPY      ENDOSCOPY, COLON, DIAGNOSTIC      ERCP      LAPAROSCOPY  2017    diagnostic laparoscopy    LEG TENDON SURGERY      right leg    UPPER GASTROINTESTINAL ENDOSCOPY N/A 2017    with biiopsy     Social  Social History   Substance Use Topics    Smoking status: Former Smoker     Types: Pipe    Smokeless tobacco: Never Used      Comment: quit pipe smoking     Alcohol use No      Comment: 3 drinks/day until 2017 then quit     Family History   Problem Relation Age of Onset    Cancer Mother      breast    Asthma Mother     Other Father      perforating gastric ulcer-  age 73    Heart Disease Father      Milk Thistle 1000 MG CAPS Take 1,000 mg by mouth 2 times daily      potassium chloride (KLOR-CON M) 10 MEQ extended release tablet Take 1 tablet by mouth daily 90 tablet 1    pantoprazole (PROTONIX) 40 MG tablet Take 1 tablet by mouth daily 90 tablet 1    Ascorbic Acid (VITAMIN C PO) Take 1 tablet by mouth daily      Cyanocobalamin (B-12 PO) Take by mouth      Cholecalciferol (VITAMIN D3) 5000 UNITS TABS Take 1,000 Units by mouth daily       fish oil-omega-3 fatty acids 1000 MG capsule Take 2 g by mouth daily. No current facility-administered medications for this visit. Review of Systems: See HPI  All other systems reviewed and are negative. Vitals:    10/27/17 1441 10/27/17 1447   BP: (!) 160/99 (!) 147/99   Pulse: 86    SpO2: 100%    Weight: 143 lb (64.9 kg)        Physical Exam:   Constitutional: He is oriented to person, place, and time. He appears well-developed and well-nourished. No distress. HENT: Moist mucus membranes  Head: Normocephalic and atraumatic. Eyes: EOM are normal. Pupils are equal, round, and no icterus. Neck: Normal range of motion. Neck supple. No thyromegaly present. Cardiovascular: Normal rate and regular rhythm. Pulmonary/Chest: No respiratory distress. bilateral symmetrical chest rise. Bilateral breath sounds equal, no additional sounds area  Abdominal: Soft. He exhibits no distension and no mass. There is no hepatosplenomegaly. No tenderness. Extremities: He exhibits no edema. Lymphadenopathy: He has no cervical adenopathy. Neurological: Grossly intact. Skin: Skin is warm and dry. Psychiatric: He has a normal mood and affect. appropriate judgment and thought process    Assessment/Plan:  1. Malignant neoplasm of head of pancreas ([address]     2. Liver mass       A difficult clinical scenario. Patient is only 80-year-old with no significant medical comorbidities. He has a very good functional status.   Primary pancreatic cancer itself is

## 2017-11-01 ENCOUNTER — HOSPITAL ENCOUNTER (OUTPATIENT)
Dept: ONCOLOGY | Age: 62
Discharge: HOME | End: 2017-11-30
Attending: INTERNAL MEDICINE | Admitting: INTERNAL MEDICINE

## 2017-11-01 RX ORDER — BUPIVACAINE HYDROCHLORIDE AND EPINEPHRINE 2.5; 5 MG/ML; UG/ML
INJECTION, SOLUTION EPIDURAL; INFILTRATION; INTRACAUDAL; PERINEURAL
Status: DISPENSED
Start: 2017-11-01 | End: 2017-11-01

## 2017-11-07 PROBLEM — E44.0 MODERATE MALNUTRITION (HCC): Chronic | Status: ACTIVE | Noted: 2017-11-07

## 2017-11-10 ENCOUNTER — CARE COORDINATION (OUTPATIENT)
Dept: CASE MANAGEMENT | Age: 62
End: 2017-11-10

## 2017-11-10 ENCOUNTER — TELEPHONE (OUTPATIENT)
Dept: PHARMACY | Facility: CLINIC | Age: 62
End: 2017-11-10

## 2017-11-10 DIAGNOSIS — C25.0 MALIGNANT NEOPLASM OF HEAD OF PANCREAS (HCC): Primary | ICD-10-CM

## 2017-11-10 RX ORDER — ATORVASTATIN CALCIUM 40 MG/1
40 TABLET, FILM COATED ORAL NIGHTLY
COMMUNITY
End: 2018-02-13 | Stop reason: SDUPTHER

## 2017-11-10 NOTE — CARE COORDINATION
Gladys , MD Kanu Landin S/ Moises Cisse Crawford Transition Coordinator  [phone]  [email or web address]

## 2017-11-10 NOTE — TELEPHONE ENCOUNTER
(prn mouth sores) 1-2 tsp s/swallow q4-6h don't eat x 15 min after. Keflex 2 g/Nystatin 4 mil units/Hydrocortisone 40 mg/QS Lidocaine 2% w/ flavor (pt pref) 80mL/QS Water    doxazosin (CARDURA) 8 MG tablet TAKE ONE TABLET BY MOUTH ONE TIME A DAY    ondansetron (ZOFRAN) 8 MG tablet Take 1 tablet by mouth every 8 hours as needed for Nausea or Vomiting    lidocaine-prilocaine (EMLA) 2.5-2.5 % cream Apply topically a thick layer at least30 minutes prior to treatment and cover with plastic wrap    b complex vitamins capsule Take 1 capsule by mouth nightly     magnesium gluconate (MAGONATE) 500 MG tablet Take 500 mg by mouth nightly     Milk Thistle 1000 MG CAPS Take 1,000 mg by mouth nightly     potassium chloride (KLOR-CON M) 10 MEQ extended release tablet Take 1 tablet by mouth daily    pantoprazole (PROTONIX) 40 MG tablet Take 1 tablet by mouth daily    Ascorbic Acid (VITAMIN C PO) Take 1 tablet by mouth nightly     Cholecalciferol (VITAMIN D3) 5000 UNITS TABS Take 1,000 Units by mouth nightly     aspirin 81 MG tablet  · Pt currently holding until he has been instructed he can resume. Take 81 mg by mouth daily    dronabinol (MARINOL) 5 MG capsule  · Not currently taking since he is eating without difficulties. Take 1 capsule by mouth three times daily    fluorouracil 500 MG/10ML SOLN 4,580 mg home infusion  · Not currently taking. Infuse intravenously Over 46 hours Cycle every 2 weeks.  fluorouracil 500 MG/10ML SOLN 4,580 mg home infusion Infuse intravenously Over 46 hours 4580 mg     Meds marked as reviewed. Meds to Beds:No    Estimated Creatinine Clearance: 107 mL/min (based on SCr of 0.7 mg/dL). Assessment/Plan:  - Medication reconciliation completed. Number of medications reviewed: 23    - Pt is taking medications as directed by discharging physician. Number of discrepancies: 3. Instructions per discharge list provided except per below documentation.  Identified medication discrepancies/issues:   · Category 1 (0)  · Category 2 (0)  · Category 3 (0)  · Category 4 (3):  1. Aspirin - patient takes aspirin 81 mg daily - wife wanted to know if he could resume this medication. I instructed the patient's wife to hold this medication until he has been seen for his post-op appointment by surgeon. 2. Atorvastatin - added to home medication list.   3. Vitamin B12, colestipol, and fish oil - patient is no longer taking these medications. Removed from home medication list.     - [facility] active medication list updated:  · Medications Added (2):  Aspirin and atorvastatin  · Medications Removed (3):  Vitamin B12, colestipol and fish oil  · Medications Changed (0)    - Identified Potential Medication Interactions: No clinically significant interactions identified via [employer] Interaction Analysis as category D or higher.    - Renal Dosing: No renal adjustments necessary. Follow up appointment date (7 days for more severe illness, 14 days for others):    · Patient encouraged to schedule follow up with surgeon today. Patient's wife states that she will call today to make appointment.      Thank you,    Kushal Carranza, PharmD  [address]  Phone: [phone]      CLINICAL PHARMACY NOTE   POST-DISCHARGE TELEPHONE FOLLOW-UP ADDENDUM    For Pharmacy Admin Tracking Only    TCM Call Made?: Yes  [facility] Select Patient?: Yes  Total # of Interventions Recommended: 3 -   - Updated Order #: 3  Total # Interventions Accepted: 3  Intervention Severity:   - Level 1 Intervention Present?: No   - Level 2 #: 0   - Level 3 #: 0  Outreach Status: Review Complete  Care Coordinator Outreach to Patient?: No  Provider Contacted?: No  Time Spent (min): 20    Additional Documentation:

## 2017-11-12 ENCOUNTER — HOSPITAL ENCOUNTER (OUTPATIENT)
Dept: OTHER | Age: 62
Discharge: HOME | End: 2017-11-12
Attending: SURGERY | Admitting: SURGERY

## 2017-11-12 DIAGNOSIS — C25.0 MALIGNANT NEOPLASM OF HEAD OF PANCREAS (HCC): ICD-10-CM

## 2017-11-12 LAB
A/G RATIO: 1 (ref 1.1–2.2)
ALBUMIN SERPL-MCNC: 3.4 G/DL (ref 3.4–5)
ALP BLD-CCNC: 286 U/L (ref 40–129)
ALT SERPL-CCNC: 33 U/L (ref 10–40)
ANION GAP SERPL CALCULATED.3IONS-SCNC: 17 MMOL/L (ref 3–16)
AST SERPL-CCNC: 17 U/L (ref 15–37)
BILIRUB SERPL-MCNC: <0.2 MG/DL (ref 0–1)
BUN BLDV-MCNC: 19 MG/DL (ref 7–20)
CALCIUM SERPL-MCNC: 9.3 MG/DL (ref 8.3–10.6)
CHLORIDE BLD-SCNC: 98 MMOL/L (ref 99–110)
CO2: 21 MMOL/L (ref 21–32)
CREAT SERPL-MCNC: 1.1 MG/DL (ref 0.8–1.3)
GFR AFRICAN AMERICAN: >60
GFR NON-AFRICAN AMERICAN: >60
GLOBULIN: 3.5 G/DL
GLUCOSE BLD-MCNC: 121 MG/DL (ref 70–99)
POTASSIUM SERPL-SCNC: 3.3 MMOL/L (ref 3.5–5.1)
SODIUM BLD-SCNC: 136 MMOL/L (ref 136–145)
TOTAL PROTEIN: 6.9 G/DL (ref 6.4–8.2)

## 2017-11-13 ENCOUNTER — TELEPHONE (OUTPATIENT)
Dept: SURGERY | Age: 62
End: 2017-11-13

## 2017-11-13 DIAGNOSIS — C25.0 MALIGNANT NEOPLASM OF HEAD OF PANCREAS (HCC): Primary | ICD-10-CM

## 2017-11-13 LAB
ANISOCYTOSIS: ABNORMAL
BANDED NEUTROPHILS RELATIVE PERCENT: 18 % (ref 0–7)
BASOPHILS ABSOLUTE: 0 K/UL (ref 0–0.2)
BASOPHILS RELATIVE PERCENT: 0 %
DOHLE BODIES: PRESENT
EOSINOPHILS ABSOLUTE: 0.1 K/UL (ref 0–0.6)
EOSINOPHILS RELATIVE PERCENT: 1 %
HCT VFR BLD CALC: 27.5 % (ref 40.5–52.5)
HEMOGLOBIN: 8.5 G/DL (ref 13.5–17.5)
LYMPHOCYTES ABSOLUTE: 1.3 K/UL (ref 1–5.1)
LYMPHOCYTES RELATIVE PERCENT: 9 %
MCH RBC QN AUTO: 30.9 PG (ref 26–34)
MCHC RBC AUTO-ENTMCNC: 30.9 G/DL (ref 31–36)
MCV RBC AUTO: 99.9 FL (ref 80–100)
MONOCYTES ABSOLUTE: 0.3 K/UL (ref 0–1.3)
MONOCYTES RELATIVE PERCENT: 2 %
NEUTROPHILS ABSOLUTE: 12.9 K/UL (ref 1.7–7.7)
NEUTROPHILS RELATIVE PERCENT: 70 %
NUCLEATED RED BLOOD CELLS: 2 /100 WBC
OVALOCYTES: ABNORMAL
PDW BLD-RTO: 18.8 % (ref 12.4–15.4)
PLATELET # BLD: 878 K/UL (ref 135–450)
PLATELET SLIDE REVIEW: ABNORMAL
PMV BLD AUTO: 8 FL (ref 5–10.5)
POLYCHROMASIA: ABNORMAL
RBC # BLD: 2.75 M/UL (ref 4.2–5.9)
SCHISTOCYTES: ABNORMAL
SLIDE REVIEW: ABNORMAL
TOXIC GRANULATION: PRESENT
VACUOLATED NEUTROPHILS: PRESENT
WBC # BLD: 14.7 K/UL (ref 4–11)

## 2017-11-13 RX ORDER — AMOXICILLIN AND CLAVULANATE POTASSIUM 875; 125 MG/1; MG/1
1 TABLET, FILM COATED ORAL 2 TIMES DAILY
Qty: 28 TABLET | Refills: 0 | Status: SHIPPED | OUTPATIENT
Start: 2017-11-13 | End: 2017-11-27

## 2017-11-14 ENCOUNTER — HOSPITAL ENCOUNTER (OUTPATIENT)
Dept: CT IMAGING | Age: 62
Discharge: HOME | End: 2017-11-14
Attending: SURGERY | Admitting: SURGERY

## 2017-11-14 ENCOUNTER — OFFICE VISIT (OUTPATIENT)
Dept: SURGERY | Age: 62
End: 2017-11-14

## 2017-11-14 VITALS
DIASTOLIC BLOOD PRESSURE: 51 MMHG | SYSTOLIC BLOOD PRESSURE: 85 MMHG | WEIGHT: 143 LBS | OXYGEN SATURATION: 99 % | HEART RATE: 77 BPM | TEMPERATURE: 78 F | BODY MASS INDEX: 20.52 KG/M2

## 2017-11-14 VITALS
RESPIRATION RATE: 18 BRPM | TEMPERATURE: 97.6 F | OXYGEN SATURATION: 100 % | HEART RATE: 72 BPM | DIASTOLIC BLOOD PRESSURE: 59 MMHG | SYSTOLIC BLOOD PRESSURE: 96 MMHG

## 2017-11-14 DIAGNOSIS — C25.0 MALIGNANT NEOPLASM OF HEAD OF PANCREAS (HCC): Primary | ICD-10-CM

## 2017-11-14 DIAGNOSIS — C25.0 MALIGNANT NEOPLASM OF HEAD OF PANCREAS (HCC): ICD-10-CM

## 2017-11-14 PROCEDURE — 99213 OFFICE O/P EST LOW 20 MIN: CPT | Performed by: SURGERY

## 2017-11-14 RX ORDER — HEPARIN SODIUM (PORCINE) LOCK FLUSH IV SOLN 100 UNIT/ML 100 UNIT/ML
500 SOLUTION INTRAVENOUS ONCE
Status: DISCONTINUED | OUTPATIENT
Start: 2017-11-14 | End: 2017-11-15 | Stop reason: HOSPADM

## 2017-11-14 RX ORDER — HEPARIN SODIUM (PORCINE) LOCK FLUSH IV SOLN 100 UNIT/ML 100 UNIT/ML
500 SOLUTION INTRAVENOUS ONCE
Status: COMPLETED | OUTPATIENT
Start: 2017-11-14 | End: 2017-11-14

## 2017-11-14 RX ORDER — OXYCODONE HYDROCHLORIDE AND ACETAMINOPHEN 5; 325 MG/1; MG/1
1 TABLET ORAL EVERY 4 HOURS PRN
Qty: 30 TABLET | Refills: 0 | Status: SHIPPED | OUTPATIENT
Start: 2017-11-14 | End: 2017-11-21

## 2017-11-14 RX ORDER — SODIUM CHLORIDE, SODIUM LACTATE, POTASSIUM CHLORIDE, AND CALCIUM CHLORIDE .6; .31; .03; .02 G/100ML; G/100ML; G/100ML; G/100ML
1000 INJECTION, SOLUTION INTRAVENOUS ONCE
Status: COMPLETED | OUTPATIENT
Start: 2017-11-14 | End: 2017-11-14

## 2017-11-14 RX ADMIN — SODIUM CHLORIDE, SODIUM LACTATE, POTASSIUM CHLORIDE, AND CALCIUM CHLORIDE 1000 ML: .6; .31; .03; .02 INJECTION, SOLUTION INTRAVENOUS at 14:13

## 2017-11-14 RX ADMIN — HEPARIN SODIUM (PORCINE) LOCK FLUSH IV SOLN 100 UNIT/ML 500 UNITS: 100 SOLUTION at 15:04

## 2017-11-14 NOTE — PROGRESS NOTES
excision margins are negative for malignancy with         invasive carcinoma approaching to within approximately 1 mm of the         superior and inferior soft tissue margins and 2 mm of the         retroperitoneal margin.       - See case comment and synoptic report.       C. Lymph node, portal:       - One lymph node with no evidence of metastatic carcinoma ( 0\1 ).    D. Resection, portion of liver:       - Nodular focus of scar with mild fibrosis and nonspecific reactive         and inflammatory changes.       - No evidence of residual viable metastatic carcinoma identified.       E. Resection, portion of liver, section 5:       - Small fibrotic and focally necrotic nodules present along capsular         aspect of liver.       - Nonspecific reactive changes seen along the surface of the liver         with areas of organizing fibrosis, chronic inflammation focally         with giant cell reaction and extravasated blood.       - No evidence of residual viable malignancy identified,         pankeratin\CAM 5.2 stains were performed on selected blocks and         show nonspecific staining only supporting final diagnosis.       F. Resection, portion of liver, caudate nodule:       - Portion of liver with nonspecific reactive changes including mild         inflammation.       - No evidence of malignancy.       G. Resection, portion of liver, gallbladder fossa nodule:       - Portion of liver with a vague nodular appearance and areas of         surface adhesion with dense fibrosis.      - No evidence of malignancy. Synoptic report:  Procedure: Pancreaticoduodenectomy ( Whipple resection ), partial  pancreatectomy. Tumor site: Pancreatic head with extension into duodenum. Tumor size: Grossly 4.7 x 4 x 2.8 cm. Histologic type: Ductal adenocarcinoma. Histologic grade: G2 moderately differentiated.   Tumor extension:  Tumor invades duodenum to include wall and overlying mucosa  Tumor invades peripancreatic soft

## 2017-11-14 NOTE — FLOWSHEET NOTE
Port accessed per protocol for CT scan with 19 gauge 0.75\" Power port access needle, one attempt, brisk blood return, flushes easily, Tegaderm and Biopatch applied. After scan was complete Port was flushed with heparin 100 units/ml, 500 units, and  was de-accessed without complication, band-aid was applied.

## 2017-11-16 ENCOUNTER — CARE COORDINATION (OUTPATIENT)
Dept: CASE MANAGEMENT | Age: 62
End: 2017-11-16

## 2017-11-21 ENCOUNTER — CARE COORDINATION (OUTPATIENT)
Dept: CASE MANAGEMENT | Age: 62
End: 2017-11-21

## 2017-11-28 ENCOUNTER — OFFICE VISIT (OUTPATIENT)
Dept: SURGERY | Age: 62
End: 2017-11-28

## 2017-11-28 VITALS
WEIGHT: 135 LBS | DIASTOLIC BLOOD PRESSURE: 84 MMHG | TEMPERATURE: 98 F | HEART RATE: 82 BPM | SYSTOLIC BLOOD PRESSURE: 137 MMHG | OXYGEN SATURATION: 100 % | BODY MASS INDEX: 19.37 KG/M2

## 2017-11-28 DIAGNOSIS — Z98.890 POSTOPERATIVE STATE: Primary | ICD-10-CM

## 2017-11-28 PROCEDURE — 99024 POSTOP FOLLOW-UP VISIT: CPT | Performed by: SURGERY

## 2017-12-01 ENCOUNTER — HOSPITAL ENCOUNTER (OUTPATIENT)
Dept: ONCOLOGY | Age: 62
Discharge: HOME | End: 2017-12-31
Attending: INTERNAL MEDICINE | Admitting: INTERNAL MEDICINE

## 2017-12-03 NOTE — PROGRESS NOTES
Snehal Valdez is here for follow up after recent surgery. He is doing much better now. No pain. Feeling back to normal.  No other complaints. O/E: Alert, oriented x 3, sitting comfortably with out any discomfort  No respiratory distress  Abdomen - soft, non-tender and no distension  Postop wound - healed well. A/P: S/P Whipple procedure 11/1/17, recovered well. Patient is doing much better than expected for the type of surgery he had. Dietary modification discussed. Patient knows to call us if any new symptoms. Follow up with Dr. Lucius Pacheco for adjuvant therapy  Follow-up as scheduled.

## 2017-12-06 ENCOUNTER — HOSPITAL ENCOUNTER (OUTPATIENT)
Dept: GENERAL RADIOLOGY | Age: 62
Discharge: HOME | End: 2017-12-06
Attending: INTERNAL MEDICINE | Admitting: INTERNAL MEDICINE

## 2017-12-06 ENCOUNTER — TELEPHONE (OUTPATIENT)
Dept: INTERNAL MEDICINE CLINIC | Age: 62
End: 2017-12-06

## 2017-12-06 ENCOUNTER — OFFICE VISIT (OUTPATIENT)
Dept: INTERNAL MEDICINE CLINIC | Age: 62
End: 2017-12-06

## 2017-12-06 VITALS
WEIGHT: 135 LBS | SYSTOLIC BLOOD PRESSURE: 114 MMHG | BODY MASS INDEX: 19.37 KG/M2 | OXYGEN SATURATION: 99 % | HEART RATE: 83 BPM | DIASTOLIC BLOOD PRESSURE: 72 MMHG

## 2017-12-06 DIAGNOSIS — Z41.9 SURGERY, ELECTIVE: Primary | ICD-10-CM

## 2017-12-06 DIAGNOSIS — Z41.9 SURGERY, ELECTIVE: ICD-10-CM

## 2017-12-06 DIAGNOSIS — B34.9 VIRAL SYNDROME: Primary | ICD-10-CM

## 2017-12-06 DIAGNOSIS — C25.9 MALIGNANT NEOPLASM OF PANCREAS, UNSPECIFIED LOCATION OF MALIGNANCY (HCC): ICD-10-CM

## 2017-12-06 LAB
BASOPHILS ABSOLUTE: 0 K/UL (ref 0–0.2)
BASOPHILS RELATIVE PERCENT: 0.4 %
EOSINOPHILS ABSOLUTE: 0 K/UL (ref 0–0.6)
EOSINOPHILS RELATIVE PERCENT: 0.3 %
HCT VFR BLD CALC: 26.9 % (ref 40.5–52.5)
HEMOGLOBIN: 8.8 G/DL (ref 13.5–17.5)
INFLUENZA A ANTIBODY: NORMAL
INFLUENZA B ANTIBODY: NORMAL
LYMPHOCYTES ABSOLUTE: 0.6 K/UL (ref 1–5.1)
LYMPHOCYTES RELATIVE PERCENT: 8.3 %
MCH RBC QN AUTO: 31.9 PG (ref 26–34)
MCHC RBC AUTO-ENTMCNC: 32.7 G/DL (ref 31–36)
MCV RBC AUTO: 97.6 FL (ref 80–100)
MONOCYTES ABSOLUTE: 0.7 K/UL (ref 0–1.3)
MONOCYTES RELATIVE PERCENT: 9.3 %
NEUTROPHILS ABSOLUTE: 5.8 K/UL (ref 1.7–7.7)
NEUTROPHILS RELATIVE PERCENT: 81.7 %
PDW BLD-RTO: 19 % (ref 12.4–15.4)
PLATELET # BLD: 226 K/UL (ref 135–450)
PMV BLD AUTO: 8.3 FL (ref 5–10.5)
RBC # BLD: 2.76 M/UL (ref 4.2–5.9)
WBC # BLD: 7.1 K/UL (ref 4–11)

## 2017-12-06 PROCEDURE — 99213 OFFICE O/P EST LOW 20 MIN: CPT | Performed by: INTERNAL MEDICINE

## 2017-12-06 PROCEDURE — 87804 INFLUENZA ASSAY W/OPTIC: CPT | Performed by: INTERNAL MEDICINE

## 2017-12-06 ASSESSMENT — ENCOUNTER SYMPTOMS
COUGH: 0
DIARRHEA: 0
VOMITING: 0
CONSTIPATION: 0
CHEST TIGHTNESS: 0
NAUSEA: 0
ABDOMINAL DISTENTION: 0
WHEEZING: 0
SHORTNESS OF BREATH: 0
BACK PAIN: 0

## 2017-12-06 NOTE — TELEPHONE ENCOUNTER
Patient had surgery on 11/1, and he is worried he has the flu. .. Hasn't been feeling well. Nurse at [facility] suggested that he gets a swab for the flu and also has a CBC done. Scheduling set up an appointment for 9:15 this morning with Dr. Nino Cavanaugh. Can  order please be put into Epic for swab and Blood work if deemed necessary.

## 2017-12-12 ENCOUNTER — TELEPHONE (OUTPATIENT)
Dept: INTERNAL MEDICINE CLINIC | Age: 62
End: 2017-12-12

## 2017-12-14 ENCOUNTER — TELEPHONE (OUTPATIENT)
Dept: SURGERY | Age: 62
End: 2017-12-14

## 2017-12-14 NOTE — TELEPHONE ENCOUNTER
Patient requesting Script for creon. Doesn't need this until tomorrow. RN will obtain script from Dr Iliana Escobedo in a.m.

## 2017-12-15 ENCOUNTER — TELEPHONE (OUTPATIENT)
Dept: SURGERY | Age: 62
End: 2017-12-15

## 2017-12-27 RX ORDER — ATORVASTATIN CALCIUM 40 MG/1
40 TABLET, FILM COATED ORAL DAILY
Qty: 90 TABLET | Refills: 0 | Status: SHIPPED | OUTPATIENT
Start: 2017-12-27 | End: 2018-02-13 | Stop reason: SDUPTHER

## 2018-01-03 ENCOUNTER — HOSPITAL ENCOUNTER (OUTPATIENT)
Dept: ONCOLOGY | Age: 63
Discharge: HOME | End: 2018-01-31
Attending: INTERNAL MEDICINE | Admitting: INTERNAL MEDICINE

## 2018-01-05 LAB — MISCELLANEOUS LAB TEST ORDER: ABNORMAL

## 2018-01-10 DIAGNOSIS — F41.1 GAD (GENERALIZED ANXIETY DISORDER): ICD-10-CM

## 2018-01-10 DIAGNOSIS — C25.0 MALIGNANT NEOPLASM OF HEAD OF PANCREAS (HCC): ICD-10-CM

## 2018-01-11 RX ORDER — VENLAFAXINE HYDROCHLORIDE 75 MG/1
75 CAPSULE, EXTENDED RELEASE ORAL DAILY
Qty: 90 CAPSULE | Refills: 1 | Status: SHIPPED | OUTPATIENT
Start: 2018-01-11 | End: 2018-02-13 | Stop reason: SDUPTHER

## 2018-02-01 ENCOUNTER — HOSPITAL ENCOUNTER (OUTPATIENT)
Dept: ONCOLOGY | Age: 63
Discharge: HOME | End: 2018-02-28
Attending: INTERNAL MEDICINE | Admitting: INTERNAL MEDICINE

## 2018-02-07 ENCOUNTER — TELEPHONE (OUTPATIENT)
Dept: INTERNAL MEDICINE CLINIC | Age: 63
End: 2018-02-07

## 2018-02-07 NOTE — TELEPHONE ENCOUNTER
Pharmacy said our office refused a refill for Doxazosin - but Im not seeing a refill request.  Please order a refill for this at [facility]. ...   He has an appt 2/13/18  Asks for a call back at [phone]

## 2018-02-08 RX ORDER — DOXAZOSIN 8 MG/1
TABLET ORAL
Qty: 90 TABLET | Refills: 0 | Status: SHIPPED | OUTPATIENT
Start: 2018-02-08 | End: 2018-02-13 | Stop reason: SDUPTHER

## 2018-02-13 ENCOUNTER — OFFICE VISIT (OUTPATIENT)
Dept: INTERNAL MEDICINE CLINIC | Age: 63
End: 2018-02-13

## 2018-02-13 VITALS
TEMPERATURE: 98.6 F | HEART RATE: 74 BPM | WEIGHT: 132 LBS | BODY MASS INDEX: 18.94 KG/M2 | OXYGEN SATURATION: 98 % | DIASTOLIC BLOOD PRESSURE: 76 MMHG | SYSTOLIC BLOOD PRESSURE: 134 MMHG

## 2018-02-13 DIAGNOSIS — E78.2 HYPERLIPIDEMIA, MIXED: ICD-10-CM

## 2018-02-13 DIAGNOSIS — F41.1 GAD (GENERALIZED ANXIETY DISORDER): ICD-10-CM

## 2018-02-13 DIAGNOSIS — F51.04 INSOMNIA, PSYCHOPHYSIOLOGICAL: ICD-10-CM

## 2018-02-13 DIAGNOSIS — R53.83 FATIGUE, UNSPECIFIED TYPE: ICD-10-CM

## 2018-02-13 DIAGNOSIS — I10 HYPERTENSION, ESSENTIAL: Primary | ICD-10-CM

## 2018-02-13 DIAGNOSIS — K21.9 GASTROESOPHAGEAL REFLUX DISEASE WITHOUT ESOPHAGITIS: ICD-10-CM

## 2018-02-13 DIAGNOSIS — C25.0 MALIGNANT NEOPLASM OF HEAD OF PANCREAS (HCC): ICD-10-CM

## 2018-02-13 PROCEDURE — 99214 OFFICE O/P EST MOD 30 MIN: CPT | Performed by: FAMILY MEDICINE

## 2018-02-13 RX ORDER — ATORVASTATIN CALCIUM 40 MG/1
40 TABLET, FILM COATED ORAL DAILY
Qty: 90 TABLET | Refills: 1 | Status: SHIPPED | OUTPATIENT
Start: 2018-02-13 | End: 2018-11-19

## 2018-02-13 RX ORDER — VENLAFAXINE HYDROCHLORIDE 75 MG/1
75 CAPSULE, EXTENDED RELEASE ORAL DAILY
Qty: 90 CAPSULE | Refills: 1 | Status: SHIPPED | OUTPATIENT
Start: 2018-02-13 | End: 2018-08-09 | Stop reason: SDUPTHER

## 2018-02-13 RX ORDER — PANTOPRAZOLE SODIUM 40 MG/1
TABLET, DELAYED RELEASE ORAL
Qty: 90 TABLET | Refills: 1 | Status: SHIPPED | OUTPATIENT
Start: 2018-02-13 | End: 2018-09-03 | Stop reason: SDUPTHER

## 2018-02-13 RX ORDER — DOXAZOSIN 8 MG/1
TABLET ORAL
Qty: 90 TABLET | Refills: 1 | Status: SHIPPED | OUTPATIENT
Start: 2018-02-13 | End: 2018-07-16 | Stop reason: SDUPTHER

## 2018-02-13 RX ORDER — POTASSIUM CHLORIDE 750 MG/1
TABLET, EXTENDED RELEASE ORAL
Qty: 90 TABLET | Refills: 1 | Status: SHIPPED | OUTPATIENT
Start: 2018-02-13 | End: 2018-08-30 | Stop reason: ALTCHOICE

## 2018-02-13 RX ORDER — CAPECITABINE 500 MG/1
1250 TABLET, FILM COATED ORAL 2 TIMES DAILY
COMMUNITY
End: 2018-08-30 | Stop reason: ALTCHOICE

## 2018-02-14 ENCOUNTER — TELEPHONE (OUTPATIENT)
Dept: SURGERY | Age: 63
End: 2018-02-14

## 2018-02-27 ENCOUNTER — TELEPHONE (OUTPATIENT)
Dept: SURGERY | Age: 63
End: 2018-02-27

## 2018-02-28 LAB — MISCELLANEOUS LAB TEST ORDER: ABNORMAL

## 2018-03-01 ENCOUNTER — HOSPITAL ENCOUNTER (OUTPATIENT)
Dept: ONCOLOGY | Age: 63
Discharge: HOME | End: 2018-03-31
Attending: INTERNAL MEDICINE | Admitting: INTERNAL MEDICINE

## 2018-03-20 ENCOUNTER — OFFICE VISIT (OUTPATIENT)
Dept: SURGERY | Age: 63
End: 2018-03-20

## 2018-03-20 VITALS
OXYGEN SATURATION: 98 % | SYSTOLIC BLOOD PRESSURE: 132 MMHG | HEART RATE: 85 BPM | WEIGHT: 130 LBS | DIASTOLIC BLOOD PRESSURE: 80 MMHG | BODY MASS INDEX: 18.2 KG/M2 | HEIGHT: 71 IN | TEMPERATURE: 98 F

## 2018-03-20 DIAGNOSIS — C25.0 MALIGNANT NEOPLASM OF HEAD OF PANCREAS (HCC): Primary | ICD-10-CM

## 2018-03-20 PROCEDURE — 99213 OFFICE O/P EST LOW 20 MIN: CPT | Performed by: SURGERY

## 2018-03-23 LAB — CA 19-9: 519 U/ML (ref 0–35)

## 2018-04-01 ENCOUNTER — HOSPITAL ENCOUNTER (OUTPATIENT)
Dept: ONCOLOGY | Age: 63
Discharge: HOME | End: 2018-04-30
Attending: INTERNAL MEDICINE | Admitting: INTERNAL MEDICINE

## 2018-04-12 ENCOUNTER — TELEPHONE (OUTPATIENT)
Dept: SURGERY | Age: 63
End: 2018-04-12

## 2018-04-16 ENCOUNTER — TELEPHONE (OUTPATIENT)
Dept: SURGERY | Age: 63
End: 2018-04-16

## 2018-04-17 ENCOUNTER — TELEPHONE (OUTPATIENT)
Dept: SURGERY | Age: 63
End: 2018-04-17

## 2018-04-18 DIAGNOSIS — C25.9 MALIGNANT NEOPLASM OF PANCREAS, UNSPECIFIED LOCATION OF MALIGNANCY (HCC): Primary | ICD-10-CM

## 2018-04-23 RX ORDER — ATORVASTATIN CALCIUM 40 MG/1
TABLET, FILM COATED ORAL
Qty: 90 TABLET | Refills: 0 | Status: SHIPPED | OUTPATIENT
Start: 2018-04-23 | End: 2018-11-19 | Stop reason: SDUPTHER

## 2018-05-03 ENCOUNTER — HOSPITAL ENCOUNTER (OUTPATIENT)
Dept: CT IMAGING | Age: 63
Discharge: HOME | End: 2018-05-03
Attending: SURGERY | Admitting: SURGERY

## 2018-05-03 DIAGNOSIS — C25.0 MALIGNANT NEOPLASM OF HEAD OF PANCREAS (HCC): ICD-10-CM

## 2018-05-03 RX ORDER — HEPARIN SODIUM (PORCINE) LOCK FLUSH IV SOLN 100 UNIT/ML 100 UNIT/ML
500 SOLUTION INTRAVENOUS ONCE
Status: COMPLETED | OUTPATIENT
Start: 2018-05-03 | End: 2018-05-03

## 2018-05-03 RX ADMIN — HEPARIN SODIUM (PORCINE) LOCK FLUSH IV SOLN 100 UNIT/ML 500 UNITS: 100 SOLUTION at 09:53

## 2018-05-07 RX ORDER — ZOLPIDEM TARTRATE 10 MG/1
TABLET ORAL
Qty: 90 TABLET | Refills: 0 | Status: SHIPPED | OUTPATIENT
Start: 2018-05-07 | End: 2018-07-30 | Stop reason: SDUPTHER

## 2018-05-09 ENCOUNTER — HOSPITAL ENCOUNTER (OUTPATIENT)
Dept: ONCOLOGY | Age: 63
Discharge: HOME | End: 2018-05-31
Attending: INTERNAL MEDICINE | Admitting: INTERNAL MEDICINE

## 2018-05-11 LAB — MISCELLANEOUS LAB TEST ORDER: ABNORMAL

## 2018-06-01 ENCOUNTER — HOSPITAL ENCOUNTER (OUTPATIENT)
Dept: ONCOLOGY | Age: 63
Discharge: HOME | End: 2018-06-30
Attending: INTERNAL MEDICINE | Admitting: INTERNAL MEDICINE

## 2018-07-16 RX ORDER — DOXAZOSIN 8 MG/1
TABLET ORAL
Qty: 90 TABLET | Refills: 0 | Status: ON HOLD | OUTPATIENT
Start: 2018-07-16 | End: 2018-12-11

## 2018-07-30 DIAGNOSIS — F51.01 PRIMARY INSOMNIA: Primary | ICD-10-CM

## 2018-07-30 RX ORDER — ZOLPIDEM TARTRATE 10 MG/1
TABLET ORAL
Qty: 90 TABLET | Refills: 0 | Status: SHIPPED | OUTPATIENT
Start: 2018-07-30 | End: 2018-10-08 | Stop reason: SDUPTHER

## 2018-08-09 DIAGNOSIS — F41.1 GAD (GENERALIZED ANXIETY DISORDER): ICD-10-CM

## 2018-08-09 DIAGNOSIS — C25.0 MALIGNANT NEOPLASM OF HEAD OF PANCREAS (HCC): ICD-10-CM

## 2018-08-09 RX ORDER — VENLAFAXINE HYDROCHLORIDE 75 MG/1
75 CAPSULE, EXTENDED RELEASE ORAL DAILY
Qty: 90 CAPSULE | Refills: 1 | Status: SHIPPED | OUTPATIENT
Start: 2018-08-09 | End: 2018-08-16 | Stop reason: SDUPTHER

## 2018-08-09 NOTE — TELEPHONE ENCOUNTER
Refill request for venlafaxine medication.      Name of Cam Wren    Last visit - 2/13/18     Pending visit - none    Last refill - 2/13/18

## 2018-08-16 ENCOUNTER — TELEPHONE (OUTPATIENT)
Dept: INTERNAL MEDICINE CLINIC | Age: 63
End: 2018-08-16

## 2018-08-16 DIAGNOSIS — C25.0 MALIGNANT NEOPLASM OF HEAD OF PANCREAS (HCC): ICD-10-CM

## 2018-08-16 DIAGNOSIS — F41.1 GAD (GENERALIZED ANXIETY DISORDER): ICD-10-CM

## 2018-08-16 RX ORDER — VENLAFAXINE HYDROCHLORIDE 150 MG/1
150 CAPSULE, EXTENDED RELEASE ORAL DAILY
Qty: 90 CAPSULE | Refills: 1 | Status: ON HOLD | OUTPATIENT
Start: 2018-08-16 | End: 2018-12-11

## 2018-08-16 NOTE — TELEPHONE ENCOUNTER
Spoke to wife. Recommended increasing Effexor XR to 150 qd. New Rx sent. Will consider changing to a different medication if no improvement.

## 2018-08-16 NOTE — TELEPHONE ENCOUNTER
Wife Ricardo Weller is in [place] at her sisters  and she in very concerned about her  who is at home. He continues to be very depressed and emotional... And is seems to be getting worse. He started counseling for this last week at the hospital, but had to cancel this weeks appt. She has a neighbor checking in on him, but he states that he is not doing well. Cindy doesn't know what to do and asks if Dr. Vivi Pulido can please call her to give her some advice.   [phone]

## 2018-08-30 ENCOUNTER — OFFICE VISIT (OUTPATIENT)
Dept: INTERNAL MEDICINE CLINIC | Age: 63
End: 2018-08-30

## 2018-08-30 VITALS
SYSTOLIC BLOOD PRESSURE: 118 MMHG | OXYGEN SATURATION: 95 % | WEIGHT: 115 LBS | TEMPERATURE: 98.3 F | BODY MASS INDEX: 16.04 KG/M2 | DIASTOLIC BLOOD PRESSURE: 84 MMHG | HEART RATE: 99 BPM

## 2018-08-30 DIAGNOSIS — F32.1 CURRENT MODERATE EPISODE OF MAJOR DEPRESSIVE DISORDER WITHOUT PRIOR EPISODE (HCC): ICD-10-CM

## 2018-08-30 DIAGNOSIS — R64 CACHEXIA (HCC): Primary | ICD-10-CM

## 2018-08-30 DIAGNOSIS — E44.0 MODERATE MALNUTRITION (HCC): Chronic | ICD-10-CM

## 2018-08-30 DIAGNOSIS — C25.0 MALIGNANT NEOPLASM OF HEAD OF PANCREAS (HCC): ICD-10-CM

## 2018-08-30 PROCEDURE — 99214 OFFICE O/P EST MOD 30 MIN: CPT | Performed by: FAMILY MEDICINE

## 2018-08-30 RX ORDER — MEGESTROL ACETATE 40 MG/ML
400 SUSPENSION ORAL 2 TIMES DAILY
Qty: 600 ML | Refills: 3 | Status: SHIPPED | OUTPATIENT
Start: 2018-08-30

## 2018-08-30 ASSESSMENT — ENCOUNTER SYMPTOMS
EYE PAIN: 0
COUGH: 0
WHEEZING: 0
COLOR CHANGE: 0
BACK PAIN: 0
EYE REDNESS: 0
SHORTNESS OF BREATH: 0
CHEST TIGHTNESS: 0
NAUSEA: 1
DIARRHEA: 1
CONSTIPATION: 0
SORE THROAT: 0
EYE DISCHARGE: 0
TROUBLE SWALLOWING: 0
SINUS PRESSURE: 0
RHINORRHEA: 0
ABDOMINAL PAIN: 0
VOMITING: 1

## 2018-08-30 NOTE — PROGRESS NOTES
03/20/18 130 lb (59 kg)   02/13/18 132 lb (59.9 kg)       BP Readings from Last 3 Encounters:   08/30/18 118/84   03/20/18 132/80   02/13/18 134/76       Vitals:    08/30/18 1300   BP: 118/84   Pulse: 99   Temp: 98.3 °F (36.8 °C)   SpO2: 95%       Physical Exam  Nursing note and vitals reviewed. Vitals:    08/30/18 1300   BP: 118/84   Pulse: 99   Temp: 98.3 °F (36.8 °C)   TempSrc: Oral   SpO2: 95%   Weight: 115 lb (52.2 kg)     Wt Readings from Last 3 Encounters:   08/30/18 115 lb (52.2 kg)   03/20/18 130 lb (59 kg)   02/13/18 132 lb (59.9 kg)     BP Readings from Last 3 Encounters:   08/30/18 118/84   03/20/18 132/80   02/13/18 134/76     Body mass index is 16.04 kg/m². Constitutional: Patient appears cachectic. No respiratory distress. Head: Normocephalic and atraumatic. Oropharyngeal exam: mucous membranes moist, pharynx normal without lesions. Neck: Normal range of motion. Neck supple. No thyroidmegaly. Cardiovascular: Normal rate, regular rhythm, normal heart sounds and intact distal pulses. Pulmonary/Chest: Effort normal and breath sounds normal. No stridor. No respiratory distress. No wheezes and no rales. Abdominal: Soft. Bowel sounds are normal. No distension and no mass. No tenderness. No rebound and no guarding. Musculoskeletal: No edema and no tenderness. Skin: No rash or erythema. Psychiatric: Normal mood and affect. Behavior is normal.       Assessment       Diagnosis Orders   1. Cachexia (HCC)  megestrol (MEGACE ORAL) 40 MG/ML suspension   2. Malignant neoplasm of head of pancreas (HCC)  megestrol (MEGACE ORAL) 40 MG/ML suspension   3. Moderate malnutrition (HCC)  megestrol (MEGACE ORAL) 40 MG/ML suspension   4. Major depressive disorder without prior episode, current, moderate ([address]              Plan      Hillary Garcia was seen today for weight loss. Diagnoses and all orders for this visit:    Cachexia ([address]  -     megestrol (MEGACE ORAL) 40 MG/ML suspension;  Take 10 mLs by mouth 2

## 2018-09-03 DIAGNOSIS — K21.9 GASTROESOPHAGEAL REFLUX DISEASE WITHOUT ESOPHAGITIS: ICD-10-CM

## 2018-09-03 RX ORDER — PANTOPRAZOLE SODIUM 40 MG/1
TABLET, DELAYED RELEASE ORAL
Qty: 90 TABLET | Refills: 1 | Status: SHIPPED | OUTPATIENT
Start: 2018-09-03

## 2018-09-06 ENCOUNTER — APPOINTMENT (OUTPATIENT)
Dept: CT IMAGING | Age: 63
DRG: 175 | End: 2018-09-06
Payer: COMMERCIAL

## 2018-09-06 ENCOUNTER — APPOINTMENT (OUTPATIENT)
Dept: GENERAL RADIOLOGY | Age: 63
DRG: 175 | End: 2018-09-06
Payer: COMMERCIAL

## 2018-09-06 ENCOUNTER — NURSE TRIAGE (OUTPATIENT)
Dept: OTHER | Facility: CLINIC | Age: 63
End: 2018-09-06

## 2018-09-06 ENCOUNTER — PATIENT MESSAGE (OUTPATIENT)
Dept: INTERNAL MEDICINE CLINIC | Age: 63
End: 2018-09-06

## 2018-09-06 ENCOUNTER — HOSPITAL ENCOUNTER (INPATIENT)
Age: 63
LOS: 5 days | Discharge: HOME | DRG: 175 | End: 2018-09-11
Attending: EMERGENCY MEDICINE | Admitting: INTERNAL MEDICINE
Payer: COMMERCIAL

## 2018-09-06 DIAGNOSIS — R06.00 DYSPNEA, UNSPECIFIED TYPE: Primary | ICD-10-CM

## 2018-09-06 DIAGNOSIS — I26.99 OTHER ACUTE PULMONARY EMBOLISM WITHOUT ACUTE COR PULMONALE (HCC): ICD-10-CM

## 2018-09-06 PROBLEM — R06.02 SOB (SHORTNESS OF BREATH): Status: ACTIVE | Noted: 2018-09-06

## 2018-09-06 LAB
ANION GAP SERPL CALCULATED.3IONS-SCNC: 13 MMOL/L (ref 3–16)
APTT: 28.5 SEC (ref 26–36)
BASOPHILS ABSOLUTE: 0 K/UL (ref 0–0.2)
BASOPHILS RELATIVE PERCENT: 0.3 %
BUN BLDV-MCNC: 12 MG/DL (ref 7–20)
CALCIUM SERPL-MCNC: 7.8 MG/DL (ref 8.3–10.6)
CHLORIDE BLD-SCNC: 107 MMOL/L (ref 99–110)
CO2: 21 MMOL/L (ref 21–32)
CREAT SERPL-MCNC: 0.8 MG/DL (ref 0.8–1.3)
D DIMER: 2565 NG/ML DDU (ref 0–229)
EOSINOPHILS ABSOLUTE: 0 K/UL (ref 0–0.6)
EOSINOPHILS RELATIVE PERCENT: 0.1 %
GFR AFRICAN AMERICAN: >60
GFR NON-AFRICAN AMERICAN: >60
GLUCOSE BLD-MCNC: 168 MG/DL (ref 70–99)
HCT VFR BLD CALC: 32.9 % (ref 40.5–52.5)
HEMOGLOBIN: 11.2 G/DL (ref 13.5–17.5)
INR BLD: 0.95 (ref 0.86–1.14)
LYMPHOCYTES ABSOLUTE: 1 K/UL (ref 1–5.1)
LYMPHOCYTES RELATIVE PERCENT: 15.6 %
MAGNESIUM: 1.7 MG/DL (ref 1.8–2.4)
MCH RBC QN AUTO: 38.2 PG (ref 26–34)
MCHC RBC AUTO-ENTMCNC: 34.1 G/DL (ref 31–36)
MCV RBC AUTO: 112 FL (ref 80–100)
MONOCYTES ABSOLUTE: 0.5 K/UL (ref 0–1.3)
MONOCYTES RELATIVE PERCENT: 7.8 %
NEUTROPHILS ABSOLUTE: 4.9 K/UL (ref 1.7–7.7)
NEUTROPHILS RELATIVE PERCENT: 76.2 %
PDW BLD-RTO: 14 % (ref 12.4–15.4)
PLATELET # BLD: 103 K/UL (ref 135–450)
PMV BLD AUTO: 7.5 FL (ref 5–10.5)
POTASSIUM REFLEX MAGNESIUM: 3.5 MMOL/L (ref 3.5–5.1)
PRO-BNP: 438 PG/ML (ref 0–124)
PROTHROMBIN TIME: 10.8 SEC (ref 9.8–13)
RBC # BLD: 2.93 M/UL (ref 4.2–5.9)
SODIUM BLD-SCNC: 141 MMOL/L (ref 136–145)
TROPONIN: <0.01 NG/ML
WBC # BLD: 6.4 K/UL (ref 4–11)

## 2018-09-06 PROCEDURE — 6360000002 HC RX W HCPCS: Performed by: EMERGENCY MEDICINE

## 2018-09-06 PROCEDURE — 83735 ASSAY OF MAGNESIUM: CPT

## 2018-09-06 PROCEDURE — 71275 CT ANGIOGRAPHY CHEST: CPT

## 2018-09-06 PROCEDURE — 6360000004 HC RX CONTRAST MEDICATION: Performed by: EMERGENCY MEDICINE

## 2018-09-06 PROCEDURE — 96374 THER/PROPH/DIAG INJ IV PUSH: CPT

## 2018-09-06 PROCEDURE — 99285 EMERGENCY DEPT VISIT HI MDM: CPT

## 2018-09-06 PROCEDURE — 80048 BASIC METABOLIC PNL TOTAL CA: CPT

## 2018-09-06 PROCEDURE — 85025 COMPLETE CBC W/AUTO DIFF WBC: CPT

## 2018-09-06 PROCEDURE — 85379 FIBRIN DEGRADATION QUANT: CPT

## 2018-09-06 PROCEDURE — 93005 ELECTROCARDIOGRAM TRACING: CPT | Performed by: EMERGENCY MEDICINE

## 2018-09-06 PROCEDURE — 85610 PROTHROMBIN TIME: CPT

## 2018-09-06 PROCEDURE — 73610 X-RAY EXAM OF ANKLE: CPT

## 2018-09-06 PROCEDURE — 1200000000 HC SEMI PRIVATE

## 2018-09-06 PROCEDURE — 71046 X-RAY EXAM CHEST 2 VIEWS: CPT

## 2018-09-06 PROCEDURE — 84484 ASSAY OF TROPONIN QUANT: CPT

## 2018-09-06 PROCEDURE — 70450 CT HEAD/BRAIN W/O DYE: CPT

## 2018-09-06 PROCEDURE — 85730 THROMBOPLASTIN TIME PARTIAL: CPT

## 2018-09-06 PROCEDURE — 83880 ASSAY OF NATRIURETIC PEPTIDE: CPT

## 2018-09-06 PROCEDURE — 36415 COLL VENOUS BLD VENIPUNCTURE: CPT

## 2018-09-06 RX ORDER — HEPARIN SODIUM 5000 [USP'U]/ML
80 INJECTION, SOLUTION INTRAVENOUS; SUBCUTANEOUS PRN
Status: DISCONTINUED | OUTPATIENT
Start: 2018-09-06 | End: 2018-09-10 | Stop reason: ALTCHOICE

## 2018-09-06 RX ORDER — HEPARIN SODIUM 5000 [USP'U]/ML
40 INJECTION, SOLUTION INTRAVENOUS; SUBCUTANEOUS PRN
Status: DISCONTINUED | OUTPATIENT
Start: 2018-09-06 | End: 2018-09-10 | Stop reason: ALTCHOICE

## 2018-09-06 RX ORDER — HEPARIN SODIUM 5000 [USP'U]/ML
80 INJECTION, SOLUTION INTRAVENOUS; SUBCUTANEOUS ONCE
Status: COMPLETED | OUTPATIENT
Start: 2018-09-06 | End: 2018-09-06

## 2018-09-06 RX ORDER — HEPARIN SODIUM 10000 [USP'U]/100ML
18 INJECTION, SOLUTION INTRAVENOUS CONTINUOUS
Status: DISCONTINUED | OUTPATIENT
Start: 2018-09-06 | End: 2018-09-08

## 2018-09-06 RX ADMIN — IOPAMIDOL 80 ML: 755 INJECTION, SOLUTION INTRAVENOUS at 22:08

## 2018-09-06 RX ADMIN — HEPARIN SODIUM 18 UNITS/KG/HR: 10000 INJECTION, SOLUTION INTRAVENOUS at 23:44

## 2018-09-06 RX ADMIN — HEPARIN SODIUM 4200 UNITS: 5000 INJECTION INTRAVENOUS; SUBCUTANEOUS at 23:43

## 2018-09-06 ASSESSMENT — ENCOUNTER SYMPTOMS
SHORTNESS OF BREATH: 1
BACK PAIN: 0
COUGH: 0
VOMITING: 0
TROUBLE SWALLOWING: 0
DIARRHEA: 0
ABDOMINAL PAIN: 0
NAUSEA: 0
PHOTOPHOBIA: 0

## 2018-09-06 NOTE — TELEPHONE ENCOUNTER
From: Rui Garcia  To: Frank Coy MD  Sent: 9/6/2018 3:59 PM EDT  Subject: Prescription Question    With increasing the venlafaxine Yasir Jordon is having some side effects, swelling in right ankle, confusion and trouble catching his breath. He has been consuming alcohol, not sure how much. I was thinking tomorrow to go back to the original dose? What are your thoughts? Ps ...his appetite is great!!  Thank you, Nathan Sabillon  [phone] or [phone].

## 2018-09-06 NOTE — TELEPHONE ENCOUNTER
Reason for Disposition   Caller has already spoken with the PCP and has no further questions. Protocols used: NO CONTACT OR DUPLICATE CONTACT CALL-ADULT-AH    Hx pancreatic cancer a year ago. Tonight has SOB, some confusion, right leg swelling. She has spoken to his PCP Dr. Yoli Toussaint and directed to ER.   Wife will take to [facility] ER

## 2018-09-06 NOTE — TELEPHONE ENCOUNTER
I called and spoke with pt's wife. A few hours ago, pt had an episode of confusion where he was rubbing his fingers together and kept asking for wife to hand him juice that was not there. She thinks he may have meant soup that was nearby. \"Almost like he was hallucinating or something. \" Also just today has had swelling of R lower leg/ ankle and abrupt onset of SOB. Still seems SOB currently. Pt at higher risk of thromboembolism due to presence of pancreatic cancer as well as Megace use. Recommended that they go to ER for eval/ workup to R/O DVT/PE, possible TIA vs CVA. Wife agreed.

## 2018-09-07 LAB
AMMONIA: 46 UMOL/L (ref 16–60)
ANION GAP SERPL CALCULATED.3IONS-SCNC: 12 MMOL/L (ref 3–16)
ANTI-XA UNFRAC HEPARIN: 0.41 IU/ML (ref 0.3–0.7)
ANTI-XA UNFRAC HEPARIN: 0.47 IU/ML (ref 0.3–0.7)
ANTI-XA UNFRAC HEPARIN: 0.67 IU/ML (ref 0.3–0.7)
BASOPHILS ABSOLUTE: 0 K/UL (ref 0–0.2)
BASOPHILS RELATIVE PERCENT: 0.6 %
BUN BLDV-MCNC: 10 MG/DL (ref 7–20)
CALCIUM SERPL-MCNC: 8 MG/DL (ref 8.3–10.6)
CHLORIDE BLD-SCNC: 104 MMOL/L (ref 99–110)
CO2: 23 MMOL/L (ref 21–32)
CREAT SERPL-MCNC: 0.6 MG/DL (ref 0.8–1.3)
EOSINOPHILS ABSOLUTE: 0 K/UL (ref 0–0.6)
EOSINOPHILS RELATIVE PERCENT: 0.6 %
GFR AFRICAN AMERICAN: >60
GFR NON-AFRICAN AMERICAN: >60
GLUCOSE BLD-MCNC: 74 MG/DL (ref 70–99)
HCT VFR BLD CALC: 30.9 % (ref 40.5–52.5)
HEMOGLOBIN: 10.6 G/DL (ref 13.5–17.5)
LV EF: 50 %
LVEF MODALITY: NORMAL
LYMPHOCYTES ABSOLUTE: 1.4 K/UL (ref 1–5.1)
LYMPHOCYTES RELATIVE PERCENT: 18.9 %
MAGNESIUM: 2.2 MG/DL (ref 1.8–2.4)
MCH RBC QN AUTO: 37.9 PG (ref 26–34)
MCHC RBC AUTO-ENTMCNC: 34.2 G/DL (ref 31–36)
MCV RBC AUTO: 110.8 FL (ref 80–100)
MONOCYTES ABSOLUTE: 0.7 K/UL (ref 0–1.3)
MONOCYTES RELATIVE PERCENT: 9.1 %
NEUTROPHILS ABSOLUTE: 5.1 K/UL (ref 1.7–7.7)
NEUTROPHILS RELATIVE PERCENT: 70.8 %
PDW BLD-RTO: 14.1 % (ref 12.4–15.4)
PLATELET # BLD: 101 K/UL (ref 135–450)
PMV BLD AUTO: 7.5 FL (ref 5–10.5)
POTASSIUM REFLEX MAGNESIUM: 3.1 MMOL/L (ref 3.5–5.1)
RBC # BLD: 2.79 M/UL (ref 4.2–5.9)
SODIUM BLD-SCNC: 139 MMOL/L (ref 136–145)
WBC # BLD: 7.3 K/UL (ref 4–11)

## 2018-09-07 PROCEDURE — 6360000002 HC RX W HCPCS: Performed by: INTERNAL MEDICINE

## 2018-09-07 PROCEDURE — 80048 BASIC METABOLIC PNL TOTAL CA: CPT

## 2018-09-07 PROCEDURE — 1200000000 HC SEMI PRIVATE

## 2018-09-07 PROCEDURE — 6370000000 HC RX 637 (ALT 250 FOR IP): Performed by: INTERNAL MEDICINE

## 2018-09-07 PROCEDURE — 82746 ASSAY OF FOLIC ACID SERUM: CPT

## 2018-09-07 PROCEDURE — S0028 INJECTION, FAMOTIDINE, 20 MG: HCPCS | Performed by: INTERNAL MEDICINE

## 2018-09-07 PROCEDURE — 93306 TTE W/DOPPLER COMPLETE: CPT

## 2018-09-07 PROCEDURE — 85520 HEPARIN ASSAY: CPT

## 2018-09-07 PROCEDURE — 83735 ASSAY OF MAGNESIUM: CPT

## 2018-09-07 PROCEDURE — 2580000003 HC RX 258: Performed by: INTERNAL MEDICINE

## 2018-09-07 PROCEDURE — 82607 VITAMIN B-12: CPT

## 2018-09-07 PROCEDURE — 2500000003 HC RX 250 WO HCPCS: Performed by: INTERNAL MEDICINE

## 2018-09-07 PROCEDURE — 36415 COLL VENOUS BLD VENIPUNCTURE: CPT

## 2018-09-07 PROCEDURE — 82140 ASSAY OF AMMONIA: CPT

## 2018-09-07 PROCEDURE — 6370000000 HC RX 637 (ALT 250 FOR IP): Performed by: STUDENT IN AN ORGANIZED HEALTH CARE EDUCATION/TRAINING PROGRAM

## 2018-09-07 PROCEDURE — 93970 EXTREMITY STUDY: CPT

## 2018-09-07 PROCEDURE — 85025 COMPLETE CBC W/AUTO DIFF WBC: CPT

## 2018-09-07 PROCEDURE — 99223 1ST HOSP IP/OBS HIGH 75: CPT | Performed by: SURGERY

## 2018-09-07 RX ORDER — SODIUM CHLORIDE 9 MG/ML
INJECTION, SOLUTION INTRAVENOUS CONTINUOUS
Status: DISCONTINUED | OUTPATIENT
Start: 2018-09-07 | End: 2018-09-09

## 2018-09-07 RX ORDER — ZOLPIDEM TARTRATE 5 MG/1
5 TABLET ORAL NIGHTLY PRN
Status: DISCONTINUED | OUTPATIENT
Start: 2018-09-07 | End: 2018-09-11 | Stop reason: HOSPADM

## 2018-09-07 RX ORDER — DOCUSATE SODIUM 100 MG/1
100 CAPSULE, LIQUID FILLED ORAL 2 TIMES DAILY PRN
Status: DISCONTINUED | OUTPATIENT
Start: 2018-09-07 | End: 2018-09-11 | Stop reason: HOSPADM

## 2018-09-07 RX ORDER — DIPHENHYDRAMINE HYDROCHLORIDE 50 MG/ML
12.5 INJECTION INTRAMUSCULAR; INTRAVENOUS ONCE
Status: COMPLETED | OUTPATIENT
Start: 2018-09-07 | End: 2018-09-07

## 2018-09-07 RX ORDER — LORAZEPAM 2 MG/ML
1 INJECTION INTRAMUSCULAR ONCE
Status: COMPLETED | OUTPATIENT
Start: 2018-09-07 | End: 2018-09-08

## 2018-09-07 RX ORDER — SODIUM CHLORIDE 0.9 % (FLUSH) 0.9 %
10 SYRINGE (ML) INJECTION EVERY 12 HOURS SCHEDULED
Status: DISCONTINUED | OUTPATIENT
Start: 2018-09-07 | End: 2018-09-11 | Stop reason: HOSPADM

## 2018-09-07 RX ORDER — DOXAZOSIN MESYLATE 4 MG/1
8 TABLET ORAL DAILY
Status: DISCONTINUED | OUTPATIENT
Start: 2018-09-07 | End: 2018-09-11 | Stop reason: HOSPADM

## 2018-09-07 RX ORDER — MEGESTROL ACETATE 40 MG/ML
400 SUSPENSION ORAL 2 TIMES DAILY
Status: DISCONTINUED | OUTPATIENT
Start: 2018-09-07 | End: 2018-09-11 | Stop reason: HOSPADM

## 2018-09-07 RX ORDER — ATORVASTATIN CALCIUM 20 MG/1
40 TABLET, FILM COATED ORAL DAILY
Status: DISCONTINUED | OUTPATIENT
Start: 2018-09-07 | End: 2018-09-11 | Stop reason: HOSPADM

## 2018-09-07 RX ORDER — MAGNESIUM SULFATE 1 G/100ML
1 INJECTION INTRAVENOUS
Status: COMPLETED | OUTPATIENT
Start: 2018-09-07 | End: 2018-09-07

## 2018-09-07 RX ORDER — ONDANSETRON 2 MG/ML
4 INJECTION INTRAMUSCULAR; INTRAVENOUS EVERY 6 HOURS PRN
Status: DISCONTINUED | OUTPATIENT
Start: 2018-09-07 | End: 2018-09-11 | Stop reason: HOSPADM

## 2018-09-07 RX ORDER — MAGNESIUM SULFATE IN WATER 40 MG/ML
2 INJECTION, SOLUTION INTRAVENOUS ONCE
Status: DISCONTINUED | OUTPATIENT
Start: 2018-09-07 | End: 2018-09-07 | Stop reason: SDUPTHER

## 2018-09-07 RX ORDER — POTASSIUM CHLORIDE 20 MEQ/1
40 TABLET, EXTENDED RELEASE ORAL ONCE
Status: DISCONTINUED | OUTPATIENT
Start: 2018-09-07 | End: 2018-09-07

## 2018-09-07 RX ORDER — VENLAFAXINE HYDROCHLORIDE 150 MG/1
150 CAPSULE, EXTENDED RELEASE ORAL DAILY
Status: DISCONTINUED | OUTPATIENT
Start: 2018-09-07 | End: 2018-09-11 | Stop reason: HOSPADM

## 2018-09-07 RX ORDER — HYDROCHLOROTHIAZIDE 25 MG/1
25 TABLET ORAL DAILY
Status: DISCONTINUED | OUTPATIENT
Start: 2018-09-07 | End: 2018-09-11 | Stop reason: HOSPADM

## 2018-09-07 RX ORDER — POTASSIUM CHLORIDE 20 MEQ/1
20 TABLET, EXTENDED RELEASE ORAL ONCE
Status: DISCONTINUED | OUTPATIENT
Start: 2018-09-07 | End: 2018-09-07

## 2018-09-07 RX ORDER — METHYLPREDNISOLONE SODIUM SUCCINATE 125 MG/2ML
80 INJECTION, POWDER, LYOPHILIZED, FOR SOLUTION INTRAMUSCULAR; INTRAVENOUS ONCE
Status: COMPLETED | OUTPATIENT
Start: 2018-09-07 | End: 2018-09-07

## 2018-09-07 RX ORDER — SODIUM CHLORIDE 0.9 % (FLUSH) 0.9 %
10 SYRINGE (ML) INJECTION PRN
Status: DISCONTINUED | OUTPATIENT
Start: 2018-09-07 | End: 2018-09-11 | Stop reason: HOSPADM

## 2018-09-07 RX ORDER — PANTOPRAZOLE SODIUM 40 MG/1
40 TABLET, DELAYED RELEASE ORAL
Status: DISCONTINUED | OUTPATIENT
Start: 2018-09-07 | End: 2018-09-11 | Stop reason: HOSPADM

## 2018-09-07 RX ORDER — POTASSIUM CHLORIDE 20 MEQ/1
20 TABLET, EXTENDED RELEASE ORAL ONCE
Status: COMPLETED | OUTPATIENT
Start: 2018-09-07 | End: 2018-09-07

## 2018-09-07 RX ADMIN — MEGESTROL ACETATE 400 MG: 40 SUSPENSION ORAL at 11:36

## 2018-09-07 RX ADMIN — MEGESTROL ACETATE 400 MG: 40 SUSPENSION ORAL at 21:05

## 2018-09-07 RX ADMIN — VENLAFAXINE HYDROCHLORIDE 150 MG: 150 CAPSULE, EXTENDED RELEASE ORAL at 11:36

## 2018-09-07 RX ADMIN — FAMOTIDINE 20 MG: 10 INJECTION, SOLUTION INTRAVENOUS at 22:25

## 2018-09-07 RX ADMIN — Medication 10 ML: at 11:37

## 2018-09-07 RX ADMIN — DOXAZOSIN 8 MG: 4 TABLET ORAL at 11:36

## 2018-09-07 RX ADMIN — ONDANSETRON 4 MG: 2 INJECTION INTRAMUSCULAR; INTRAVENOUS at 19:11

## 2018-09-07 RX ADMIN — POTASSIUM CHLORIDE 20 MEQ: 20 TABLET, EXTENDED RELEASE ORAL at 15:15

## 2018-09-07 RX ADMIN — SODIUM CHLORIDE: 9 INJECTION, SOLUTION INTRAVENOUS at 12:41

## 2018-09-07 RX ADMIN — MAGNESIUM SULFATE HEPTAHYDRATE 1 G: 1 INJECTION, SOLUTION INTRAVENOUS at 03:13

## 2018-09-07 RX ADMIN — ONDANSETRON 4 MG: 2 INJECTION INTRAMUSCULAR; INTRAVENOUS at 01:14

## 2018-09-07 RX ADMIN — MAGNESIUM SULFATE HEPTAHYDRATE 1 G: 1 INJECTION, SOLUTION INTRAVENOUS at 02:07

## 2018-09-07 RX ADMIN — ATORVASTATIN CALCIUM 40 MG: 20 TABLET, FILM COATED ORAL at 11:36

## 2018-09-07 RX ADMIN — METHYLPREDNISOLONE SODIUM SUCCINATE 80 MG: 125 INJECTION, POWDER, FOR SOLUTION INTRAMUSCULAR; INTRAVENOUS at 22:24

## 2018-09-07 RX ADMIN — DIPHENHYDRAMINE HYDROCHLORIDE 12.5 MG: 50 INJECTION, SOLUTION INTRAMUSCULAR; INTRAVENOUS at 22:25

## 2018-09-07 RX ADMIN — PANTOPRAZOLE SODIUM 40 MG: 40 TABLET, DELAYED RELEASE ORAL at 05:44

## 2018-09-07 RX ADMIN — HYDROCHLOROTHIAZIDE 25 MG: 25 TABLET ORAL at 12:41

## 2018-09-07 NOTE — CONSULTS
the greater saphenous veins, not involving the junction of the common femoral vein, involving the proximal thigh segment. Within the limits of this exam, there is no other evidence of deep or superficial venous thrombosis of the lower right extremity. Left Limited visualization of the calf veins due to body habitus and patient positioning. There is acute deep venous thrombosis of the posterior tibial and peroneal veins. There is chronic superficial venous thrombosis of the proximal greater saphenous vein that does not involve the junction of the common femoral vein. Within the limits of this exam, there is no other evidence of deep or superficial venous thrombosis of the lower left extremity. There are no prior exams for comparison. Problem List  Patient Active Problem List   Diagnosis    Biliary dyskinesia    Sprain of medial collateral ligament of left knee    Acute pain of right knee    Meniscus, medial, derangement    Hypertension, essential    Hyperlipidemia, mixed    GERD (gastroesophageal reflux disease) without esophagitis    SELINA (generalized anxiety disorder)    Insomnia, psychophysiological    Hyperbilirubinemia    Malignant neoplasm of pancreas ([address]    Malignant neoplasm of head of pancreas ([address]    SBO (small bowel obstruction) (HCC)    Moderate malnutrition ([address]    Cachexia ([address]    Major depressive disorder without prior episode, current, moderate (HCC)    SOB (shortness of breath)       IMPRESSION/RECOMMENDATIONS:    Mr. Isaac Ryan is a 62 yo gentleman with Stage IV adenocarcinoma who presents with an acute PE and bilateral lower extremity DVTs.     Acute PE 2/2 hypercoagulability in malignancy  -CTPA shows R moderate PE   -hemodynamically stable  -heparin gtt, monitoring anti-XA levels  -DVTs in BLE  -ECHO normal    Stage IV pancreatic adenocarcinoma  -s/p Whipple, neoadjuvant and adjuvant chemo  -3x3cm mass encasing hepatic artery, LLL pleural nodule  -MRI abdomen W WO (order placed) to assess questionable mass around hepatic artery seen on CTPA   -Give 1 mg Ativan before MRI (order placed)  -consult Dr. Sutherland Speaks, surg onc    Macrocytic Anemia  -will check Vit B12 and folate levels    Hypokalemia  -looks like pt was on outpatient K supplements  -replace as needed  -order placed for 20 mEq this afternoon    Thank you for the consultation. Will follow with you  Discussed with Dr. Ba Goodman MD  Patient Education/Coordination of care: I spent 20 minutes with patient and or floor time today. 20 minutes were spent counseling and/or coordinating care as outlined above.

## 2018-09-07 NOTE — CONSULTS
Resident Consult Note  General Surgery     Reason for Consult: Possible recurrence of pancreatic cancer    Chief Complaint: Dyspnea and Failure to Thrive    History of Present Illness:   Cortney Avendano is a 63 y.o. male with past medical history of hypertension, hyperlipidemia, GERD, arthritis, anxiety, depression, and pancreatic cancer diagnosed in 2017 s/p FLOFIRONOX neoadjuvant chemotherapy followed by whipple with partial caudate and segment 4 and 5 resection (11/1/2017) and Xeloda & Gemcitabine adjuvant chemotherapy, who presented to the ED yesterday secondary to dyspnea. CT imaging not only revealed multiple pulmonary emboli in the right lung, for which he was treated with heparin, but also revealed a mass encasing the hepatic artery and a lesion within the left lobe of the liver concerning for recurrence of pancreatic cancer with metastasis. Upon further questioning, patient reports that he has been experiencing generalized malaise, significantly decreased appetite, intermittent nausea, and weight loss of ~50lbs since surgery last year. He reports no fevers, no chills, no abdominal pain, no changes in bowel movements including frequency or presence of blood, and no dysuria. He has experienced interval improvement in the dyspnea for which he presented initially.      Past Medical History:        Diagnosis Date    Anxiety     Arthritis     back and toe    Depression     GERD (gastroesophageal reflux disease)     Hyperlipidemia     Hypertension     Pancreatic [address] ([facility] [address] 7/17/2017     Past Surgical History:        Procedure Laterality Date    ABDOMINAL EXPLORATION SURGERY  11/01/2017    EXPLORATORY LAPAROTOMY, PANCREATICODUODENECTOMY, LIVER LESION RESECTION, PARTIAL PORTAL VEIN DISSECTION     ACHILLES TENDON SURGERY Right 2007 (approximate)    APPENDECTOMY  7 29 11    CHOLECYSTECTOMY, LAPAROSCOPIC  7 29 11    COLONOSCOPY      ENDOSCOPY, COLON, DIAGNOSTIC      ERCP      LAPAROSCOPY  07/19/2017 diagnostic laparoscopy    LEG TENDON SURGERY      right leg    UPPER GASTROINTESTINAL ENDOSCOPY N/A 06/30/2017    with biiopsy     Allergies:  Patient has no known allergies. Medications:   Home Meds  No current facility-administered medications on file prior to encounter.       Current Outpatient Prescriptions on File Prior to Encounter   Medication Sig Dispense Refill    pantoprazole (PROTONIX) 40 MG tablet TAKE ONE TABLET BY MOUTH ONE TIME A DAY 90 tablet 1    megestrol (MEGACE ORAL) 40 MG/ML suspension Take 10 mLs by mouth 2 times daily 600 mL 3    venlafaxine (EFFEXOR XR) 150 MG extended release capsule Take 1 capsule by mouth daily 90 capsule 1    zolpidem (AMBIEN) 10 MG tablet TAKE 1 TABLET BY MOUTH NIGHTLY AS NEEDED FOR SLEEP 90 tablet 0    doxazosin (CARDURA) 8 MG tablet TAKE 1 TABLET BY MOUTH ONE TIME DAILY 90 tablet 0    atorvastatin (LIPITOR) 40 MG tablet TAKE ONE TABLET BY MOUTH ONE TIME A DAY 90 tablet 0    atorvastatin (LIPITOR) 40 MG tablet Take 1 tablet by mouth daily 90 tablet 1    docusate sodium (COLACE) 100 MG capsule Take 1 capsule by mouth 2 times daily as needed for Constipation 28 capsule 0    metoclopramide (REGLAN) 5 MG tablet Take 1 tablet by mouth 3 times daily 120 tablet 0    ondansetron (ZOFRAN) 8 MG tablet Take 1 tablet by mouth every 8 hours as needed for Nausea or Vomiting 90 tablet 3    b complex vitamins capsule Take 1 capsule by mouth nightly       Ascorbic Acid (VITAMIN C PO) Take 1 tablet by mouth nightly       Cholecalciferol (VITAMIN D3) 5000 UNITS TABS Take 1,000 Units by mouth nightly       lidocaine-prilocaine (EMLA) 2.5-2.5 % cream Apply topically a thick layer at least30 minutes prior to treatment and cover with plastic wrap 30 g 3     Current Meds:    atorvastatin (LIPITOR) tablet 40 mg Daily   [START ON 9/8/2018] vitamin D (CHOLECALCIFEROL) tablet 1,000 Units Nightly   docusate sodium (COLACE) capsule 100 mg BID PRN   doxazosin (CARDURA) tablet 8 mg appearance: Alert; resting comfortably in bed in no acute distress  HEENT: Normocephalic/atraumatic; PERRL; no scleral icterus; trachea midline; no JVD; no lymphadenopathy  Chest/Lungs: Normal effort; breathing room air; bibasilar fine crackles   Cardiovascular: Regular rate and rhythm; no murmurs, no rubs, no gallops   Abdomen: Well-healed midline scar from previous whipple; non-distended; soft; non-tender; no guarding, no rigidity, no rebound  Skin: warm and dry  Extremities: no edema; no cyanosis  Neuro: A&Ox3; no focal deficits; sensation intact    Laboratory Results:    CBC: Recent Labs      09/06/18 2109 09/07/18   0554   WBC  6.4  7.3   HGB  11.2*  10.6*   HCT  32.9*  30.9*   MCV  112.0*  110.8*   PLT  103*  101*     BMP: Recent Labs      09/06/18 2110 09/07/18   0554   NA  141  139   K  3.5  3.1*   CL  107  104   CO2  21  23   BUN  12  10   CREATININE  0.8  0.6*     PT/INR:   Recent Labs      09/06/18 2109   PROTIME  10.8   INR  0.95     APTT:   Recent Labs      09/06/18   2109   APTT  28.5     Liver Profile:  Lab Results   Component Value Date    AST 17 11/12/2017    ALT 33 11/12/2017    BILIDIR <0.2 11/08/2017    BILITOT <0.2 11/12/2017    ALKPHOS 286 11/12/2017     Lab Results   Component Value Date    CHOL 220 02/13/2017     02/13/2017    HDL 72 01/18/2012    TRIG 172 02/13/2017     UA: Lab Results   Component Value Date    COLORU Yellow 10/19/2017    PHUR 8.0 10/19/2017    WBCUA 3-5 10/19/2017    RBCUA 0-2 10/19/2017    BACTERIA Rare 10/19/2017    CLARITYU Clear 10/19/2017    SPECGRAV 1.010 10/19/2017    LEUKOCYTESUR Negative 10/19/2017    UROBILINOGEN 2.0 10/19/2017    BILIRUBINUR Negative 10/19/2017    BILIRUBINUR NEGATIVE 05/28/2010    BLOODU Negative 10/19/2017    GLUCOSEU Negative 10/19/2017    GLUCOSEU NEGATIVE 05/28/2010       Imaging:   VL Extremity Venous Bilateral         CTA CHEST W CONTRAST   Final Result   1. Moderate right pulmonary emboli.  Results were called to the emergency room at time of dictation. 2. 0.5 cm subpleural nodule within the left lower lobe, increased in size, consistent with a pulmonary metastasis. 3. Prior Whipple procedure. 3.0 cm mass encasing the hepatic artery, slightly increased in size from 5/3/2018.      4. 2.2 cm low-attenuation lesion within medial segment of left lobe of liver, most likely representing a hepatic metastasis. 5. Small amount of perihepatic and perisplenic ascites. CT Head WO Contrast   Final Result   Normal noncontrast cranial computed tomography. XR ANKLE RIGHT (MIN 3 VIEWS)   Final Result   1. Mild soft tissue swelling. 2. Mild bony spurring on the superior surface of tarsal bones. XR CHEST STANDARD (2 VW)   Final Result   Minimal airspace disease within the right lower lobe (atelectasis versus pneumonia). CT ABDOMEN PELVIS W IV CONTRAST Additional Contrast? Oral    (Results Pending)   MRI ABDOMEN W WO CONTRAST    (Results Pending)          Assessment/Plan: This is a 63 y.o. male with past medical history of HTN, HLD, GERD, arthritis, anxiety, depression, and pancreatic CA diagnosed in 2017 s/p FLOFIRONOX neoadjuvant chemotherapy followed by whipple with partial caudate and segment 4 and 5 resection (11/1/2017) and Xeloda & Gemcitabine adjuvant chemotherapy, who presented to the ED yesterday secondary to dyspnea. A liver lesion was incidentally discovered on CT imaging for dyspnea work-up, which is concerning for cancer recurrence. Dr. Ashlee Harris has seen the patient and has discussed the case with Dr. Mukesh Muhammad.      - MRI ordered to evaluate liver lesion -- will follow-up results   - If lesion is visualized on MRI, will require a biopsy for further decision making  - FEN: IVF: Per primary; Diet: Per primary   - Counseled regarding the importance of adequate nutrition   - VTE PPx: Already on therapeutic heparin gtt for PE, recommend SCDs as well  - Pulmonary toilet: Recommend IS and deep breathing  - Activity: Out of bed to chair and encouraged frequent ambulation   - Dispo-- OK to discharge from a surgical perspective with scheduling of biopsy as out-patient. Ike Luque MD, MPH  PGY-1 General Surgery  09/07/18  4:04 PM  [phone]    I saw and independently examined the patient today. I agree with the history of present illness, past medical/surgical histories, family history, social history, medication list and allergies as listed. The review of systems is as noted above. My physical exam confirms the findings listed above. Review of labs, pathology reports, radiology reports and medical records confirm the findings noted above. I edited the note where appropriate. Discussed with pt about likely recurrence of cancer and further workup. Management options discussed.  Importance of nutrition discussed  Will follow MRI results  Discussed with Dr. Geovanna Shaw MD  Surgery Attending

## 2018-09-07 NOTE — PROGRESS NOTES
4 Eyes Admission Assessment     I agree as the admission nurse that 2 RN's have performed a thorough Head to Toe Skin Assessment on the patient. ALL assessment sites listed below have been assessed on admission. Areas assessed by both nurses:   [x]   Head, Face, and Ears   [x]   Shoulders, Back, and Chest  [x]   Arms, Elbows, and Hands   [x]   Coccyx, Sacrum, and Ischum  [x]   Legs, Feet, and Heels        Does the Patient have Skin Breakdown?   No         Zion Prevention initiated:  No   Wound Care Orders initiated:  No      WOC nurse consulted for Pressure Injury (Stage 3,4, Unstageable, DTI, NWPT, and Complex wounds):  No      Nurse 1 eSignature: Electronically signed by Yola Victro RN on 9/7/18 at 5:28 AM    SHARE this note so that the co-signing nurse is able to place an eSignature    Nurse 2 eSignature: Electronically signed by Sage Rosado RN on 9/7/18 at 5:49 AM

## 2018-09-07 NOTE — PROGRESS NOTES
Patient is alert and oriented x 4, VSS with the exception of high BP at times. Pt. Doppler results came back positive for bilateral DVTs (medical staff is aware). Pt. Has denied pain and nausea throughout the duration of the day. Family is updated on plan of care, pt. Needs and MRI and CT scan. RN spoke with MRI this afternoon plan is for pt. To obtain MRI tomorrow. MRI safety questionnaire is completed. Pt. Is voiding adequately. RN will continue to assess.

## 2018-09-07 NOTE — H&P
Hospital Medicine History & Physical      PCP: Darcy Demarco MD    Date of Admission: 9/6/2018    Date of Service: Pt seen/examined on 9/7/2018 and Admitted to Inpatient with expected LOS greater than two midnights due to medical therapy. Chief Complaint:  SOB      History Of Present Illness:    63 y.o. male who presents with c/o shortness of breath. Patient states he was diagnosed with pancreatic cancer last year, underwent Whipple procedure in December, and had last round of chemotherapy approximately 4-6 weeks ago. He is presenting today with new onset shortness of breath for the last few days, worse on exertion. Denies fever,chills, cough or hemoptysis. He does note that his right ankle is swollen, but denies tenderness. No history of PE or DVT    Past Medical History:          Diagnosis Date    Anxiety     Arthritis     back and toe    Depression     GERD (gastroesophageal reflux disease)     Hyperlipidemia     Hypertension     Pancreatic cancer ([facility] [address] 7/17/2017       Past Surgical History:          Procedure Laterality Date    ABDOMINAL EXPLORATION SURGERY  11/01/2017    EXPLORATORY LAPAROTOMY, PANCREATICODUODENECTOMY, LIVER LESION RESECTION, PARTIAL PORTAL VEIN DISSECTION     ACHILLES TENDON SURGERY Right 2007 (approximate)    APPENDECTOMY  7 29 11    CHOLECYSTECTOMY, LAPAROSCOPIC  7 29 11    COLONOSCOPY      ENDOSCOPY, COLON, DIAGNOSTIC      ERCP      LAPAROSCOPY  07/19/2017    diagnostic laparoscopy    LEG TENDON SURGERY      right leg    UPPER GASTROINTESTINAL ENDOSCOPY N/A 06/30/2017    with biiopsy       Medications Prior to Admission:      Prior to Admission medications    Medication Sig Start Date End Date Taking?  Authorizing Provider   pantoprazole (PROTONIX) 40 MG tablet TAKE ONE TABLET BY MOUTH ONE TIME A DAY 9/3/18   Hany Sheridan MD   megestrol (MEGACE ORAL) 40 MG/ML suspension Take 10 mLs by mouth 2 times daily 8/30/18   Sean Epps MD venlafaxine (EFFEXOR XR) 150 MG extended release capsule Take 1 capsule by mouth daily 8/16/18   Esther Paris MD   zolpidem (AMBIEN) 10 MG tablet TAKE 1 TABLET BY MOUTH NIGHTLY AS NEEDED FOR SLEEP 7/30/18 10/28/18  LOBO Ervin   doxazosin (CARDURA) 8 MG tablet TAKE 1 TABLET BY MOUTH ONE TIME DAILY 7/16/18   LOBO Montiel CNP   atorvastatin (LIPITOR) 40 MG tablet TAKE ONE TABLET BY MOUTH ONE TIME A DAY 4/23/18   Negro Sheridan MD   atorvastatin (LIPITOR) 40 MG tablet Take 1 tablet by mouth daily 2/13/18   Esther Paris MD   docusate sodium (COLACE) 100 MG capsule Take 1 capsule by mouth 2 times daily as needed for Constipation 11/9/17   LOBO Cueva CNP   metoclopramide (REGLAN) 5 MG tablet Take 1 tablet by mouth 3 times daily 11/9/17   Rocio De La Cruz MD   ondansetron (ZOFRAN) 8 MG tablet Take 1 tablet by mouth every 8 hours as needed for Nausea or Vomiting 7/27/17   LOBO Baker CNP   lidocaine-prilocaine (EMLA) 2.5-2.5 % cream Apply topically a thick layer at least30 minutes prior to treatment and cover with plastic wrap 7/27/17   Mayra Ferrari MD   b complex vitamins capsule Take 1 capsule by mouth nightly     Historical Provider, MD   Ascorbic Acid (VITAMIN C PO) Take 1 tablet by mouth nightly     Historical Provider, MD   Cholecalciferol (VITAMIN D3) 5000 UNITS TABS Take 1,000 Units by mouth nightly     Historical Provider, MD       Allergies:  Patient has no known allergies. Social History:    TOBACCO:   reports that he has quit smoking. His smoking use included Cigars and Pipe. He has a 7.50 pack-year smoking history. He has never used smokeless tobacco.  ETOH:   reports that he does not drink alcohol. Family History:    Reviewed in detail and negative for DM, CAD, Cancer, CVA.  Positive as follows:    Family History   Problem Relation Age of Onset    Cancer Mother         breast    Asthma Mother     Other Father         perforating gastric ulcer-  age 68    Heart Disease Father     Depression Brother     Substance Abuse Brother     Diabetes Maternal Grandmother     Cancer Maternal Grandfather         colon- dx'd late 66's    Other Brother         atherosclerosis    Cancer Paternal Grandfather        REVIEW OF SYSTEMS:   Pertinent positives as noted in the HPI. All other systems reviewed and negative. PHYSICAL EXAM PERFORMED:    BP (!) 125/91   Pulse 93   Temp 98.5 °F (36.9 °C) (Oral)   Resp 19   Wt 115 lb (52.2 kg)   SpO2 99%   BMI 16.04 kg/m²     General appearance:  No apparent distress, appears stated age and cooperative. HEENT:  Normal cephalic, atraumatic without obvious deformity. Pupils equal, round, and reactive to light. Extra ocular muscles intact. Conjunctivae/corneas clear. Neck: Supple, with full range of motion. No jugular venous distention. Trachea midline. Respiratory:  Normal respiratory effort. Clear to auscultation, bilaterally without Rales/Wheezes/Rhonchi. Cardiovascular:  Regular rate and rhythm with normal S1/S2 without murmurs, rubs or gallops. Abdomen: Soft, non-tender, non-distended with normal bowel sounds. Musculoskeletal:  No clubbing, cyanosis or edema bilaterally. Full range of motion without deformity. Skin: Skin color, texture, turgor normal.  No rashes or lesions. Neurologic:  Neurovascularly intact without any focal sensory/motor deficits. Cranial nerves: II-XII intact, grossly non-focal.  Psychiatric:  Alert and oriented, thought content appropriate, normal insight  Capillary Refill: Brisk,< 3 seconds   Peripheral Pulses: +2 palpable, equal bilaterally       Labs:     Recent Labs      18   WBC  6.4   HGB  11.2*   HCT  32.9*   PLT  103*     Recent Labs      18   NA  141   K  3.5   CL  107   CO2  21   BUN  12   CREATININE  0.8   CALCIUM  7.8*     No results for input(s): AST, ALT, BILIDIR, BILITOT, ALKPHOS in the last 72 hours.   Recent Labs      18 you have any questions or concerns please feel free to contact me at [phone].

## 2018-09-08 ENCOUNTER — APPOINTMENT (OUTPATIENT)
Dept: MRI IMAGING | Age: 63
DRG: 175 | End: 2018-09-08
Payer: COMMERCIAL

## 2018-09-08 ENCOUNTER — APPOINTMENT (OUTPATIENT)
Dept: CT IMAGING | Age: 63
DRG: 175 | End: 2018-09-08
Payer: COMMERCIAL

## 2018-09-08 PROBLEM — R06.00 DYSPNEA: Status: ACTIVE | Noted: 2018-09-06

## 2018-09-08 PROBLEM — C78.7 PANCREATIC CANCER METASTASIZED TO LIVER (HCC): Status: ACTIVE | Noted: 2017-07-17

## 2018-09-08 LAB
ANTI-XA UNFRAC HEPARIN: 0.22 IU/ML (ref 0.3–0.7)
ANTI-XA UNFRAC HEPARIN: 0.32 IU/ML (ref 0.3–0.7)
FOLATE: 12.25 NG/ML (ref 4.78–24.2)
VITAMIN B-12: 688 PG/ML (ref 211–911)

## 2018-09-08 PROCEDURE — 74183 MRI ABD W/O CNTR FLWD CNTR: CPT

## 2018-09-08 PROCEDURE — 2580000003 HC RX 258: Performed by: INTERNAL MEDICINE

## 2018-09-08 PROCEDURE — 6360000004 HC RX CONTRAST MEDICATION: Performed by: INTERNAL MEDICINE

## 2018-09-08 PROCEDURE — 6360000002 HC RX W HCPCS: Performed by: EMERGENCY MEDICINE

## 2018-09-08 PROCEDURE — 6360000002 HC RX W HCPCS: Performed by: INTERNAL MEDICINE

## 2018-09-08 PROCEDURE — 2500000003 HC RX 250 WO HCPCS: Performed by: INTERNAL MEDICINE

## 2018-09-08 PROCEDURE — 74177 CT ABD & PELVIS W/CONTRAST: CPT

## 2018-09-08 PROCEDURE — 6360000004 HC RX CONTRAST MEDICATION: Performed by: STUDENT IN AN ORGANIZED HEALTH CARE EDUCATION/TRAINING PROGRAM

## 2018-09-08 PROCEDURE — S0028 INJECTION, FAMOTIDINE, 20 MG: HCPCS | Performed by: INTERNAL MEDICINE

## 2018-09-08 PROCEDURE — 1200000000 HC SEMI PRIVATE

## 2018-09-08 PROCEDURE — A9581 GADOXETATE DISODIUM INJ: HCPCS | Performed by: STUDENT IN AN ORGANIZED HEALTH CARE EDUCATION/TRAINING PROGRAM

## 2018-09-08 PROCEDURE — 6370000000 HC RX 637 (ALT 250 FOR IP): Performed by: INTERNAL MEDICINE

## 2018-09-08 PROCEDURE — 85520 HEPARIN ASSAY: CPT

## 2018-09-08 PROCEDURE — 6360000002 HC RX W HCPCS: Performed by: STUDENT IN AN ORGANIZED HEALTH CARE EDUCATION/TRAINING PROGRAM

## 2018-09-08 PROCEDURE — 36415 COLL VENOUS BLD VENIPUNCTURE: CPT

## 2018-09-08 RX ORDER — METOCLOPRAMIDE HYDROCHLORIDE 5 MG/ML
10 INJECTION INTRAMUSCULAR; INTRAVENOUS EVERY 6 HOURS
Status: DISCONTINUED | OUTPATIENT
Start: 2018-09-08 | End: 2018-09-11 | Stop reason: HOSPADM

## 2018-09-08 RX ORDER — HEPARIN SODIUM 10000 [USP'U]/100ML
20 INJECTION, SOLUTION INTRAVENOUS CONTINUOUS
Status: DISCONTINUED | OUTPATIENT
Start: 2018-09-08 | End: 2018-09-10

## 2018-09-08 RX ADMIN — MEGESTROL ACETATE 400 MG: 40 SUSPENSION ORAL at 19:50

## 2018-09-08 RX ADMIN — METOCLOPRAMIDE 10 MG: 5 INJECTION, SOLUTION INTRAMUSCULAR; INTRAVENOUS at 19:50

## 2018-09-08 RX ADMIN — HYDROCHLOROTHIAZIDE 25 MG: 25 TABLET ORAL at 10:48

## 2018-09-08 RX ADMIN — HEPARIN SODIUM 2100 UNITS: 5000 INJECTION INTRAVENOUS; SUBCUTANEOUS at 15:02

## 2018-09-08 RX ADMIN — IOPAMIDOL 80 ML: 755 INJECTION, SOLUTION INTRAVENOUS at 08:43

## 2018-09-08 RX ADMIN — Medication 10 ML: at 11:04

## 2018-09-08 RX ADMIN — METOCLOPRAMIDE 10 MG: 5 INJECTION, SOLUTION INTRAMUSCULAR; INTRAVENOUS at 14:00

## 2018-09-08 RX ADMIN — ZOLPIDEM TARTRATE 5 MG: 5 TABLET ORAL at 22:02

## 2018-09-08 RX ADMIN — HEPARIN SODIUM 18 UNITS/KG/HR: 10000 INJECTION, SOLUTION INTRAVENOUS at 02:38

## 2018-09-08 RX ADMIN — DOXAZOSIN 8 MG: 4 TABLET ORAL at 10:47

## 2018-09-08 RX ADMIN — VENLAFAXINE HYDROCHLORIDE 150 MG: 150 CAPSULE, EXTENDED RELEASE ORAL at 10:48

## 2018-09-08 RX ADMIN — GADOXETATE DISODIUM 6 ML: 181.43 INJECTION, SOLUTION INTRAVENOUS at 09:55

## 2018-09-08 RX ADMIN — MEGESTROL ACETATE 400 MG: 40 SUSPENSION ORAL at 10:47

## 2018-09-08 RX ADMIN — ATORVASTATIN CALCIUM 40 MG: 20 TABLET, FILM COATED ORAL at 10:48

## 2018-09-08 RX ADMIN — CHOLECALCIFEROL TAB 25 MCG (1000 UNIT) 1000 UNITS: 25 TAB at 19:50

## 2018-09-08 RX ADMIN — PANTOPRAZOLE SODIUM 40 MG: 40 TABLET, DELAYED RELEASE ORAL at 06:42

## 2018-09-08 RX ADMIN — FAMOTIDINE 20 MG: 10 INJECTION, SOLUTION INTRAVENOUS at 10:47

## 2018-09-08 RX ADMIN — LORAZEPAM 1 MG: 2 INJECTION INTRAMUSCULAR; INTRAVENOUS at 09:00

## 2018-09-08 RX ADMIN — SODIUM CHLORIDE: 9 INJECTION, SOLUTION INTRAVENOUS at 17:25

## 2018-09-08 NOTE — PROGRESS NOTES
[facility]  Oncology Hematology Care  Progress Note      SUBJECTIVE:   No events over night  Reviewed Abd Ct   Can not see MRI results    Discussed getting biopsy on Monday for diagnosis of relapse     OBJECTIVE      Medications    Current Facility-Administered Medications: iohexol (OMNIPAQUE 240) injection 50 mL, 50 mL, Oral, ONCE PRN  magic (miracle) mouthwash with nystatin, 10 mL, Swish & Swallow, PRN  metoclopramide (REGLAN) injection 10 mg, 10 mg, Intravenous, Q6H  heparin 25,000 units in dextrose 5% 250 mL infusion, 20 Units/kg/hr (Order-Specific), Intravenous, Continuous  atorvastatin (LIPITOR) tablet 40 mg, 40 mg, Oral, Daily  vitamin D (CHOLECALCIFEROL) tablet 1,000 Units, 1,000 Units, Oral, Nightly  docusate sodium (COLACE) capsule 100 mg, 100 mg, Oral, BID PRN  doxazosin (CARDURA) tablet 8 mg, 8 mg, Oral, Daily  megestrol (MEGACE) 40 MG/ML suspension 400 mg, 400 mg, Oral, BID  pantoprazole (PROTONIX) tablet 40 mg, 40 mg, Oral, QAM AC  venlafaxine (EFFEXOR XR) extended release capsule 150 mg, 150 mg, Oral, Daily  zolpidem (AMBIEN) tablet 5 mg, 5 mg, Oral, Nightly PRN  sodium chloride flush 0.9 % injection 10 mL, 10 mL, Intravenous, 2 times per day  sodium chloride flush 0.9 % injection 10 mL, 10 mL, Intravenous, PRN  magnesium hydroxide (MILK OF MAGNESIA) 400 MG/5ML suspension 30 mL, 30 mL, Oral, Daily PRN  ondansetron (ZOFRAN) injection 4 mg, 4 mg, Intravenous, Q6H PRN  hydrochlorothiazide (HYDRODIURIL) tablet 25 mg, 25 mg, Oral, Daily  0.9 % sodium chloride infusion, , Intravenous, Continuous  famotidine (PEPCID) injection 20 mg, 20 mg, Intravenous, Daily  heparin (porcine) injection 4,200 Units, 80 Units/kg (Order-Specific), Intravenous, PRN  heparin (porcine) injection 2,100 Units, 40 Units/kg (Order-Specific), Intravenous, PRN  Physical    VITALS:  /79   Pulse 68   Temp 98.5 °F (36.9 °C) (Oral)   Resp 16   Ht 5' 10.87\" (1.8 m)   Wt 115 lb (52.2 kg)   SpO2 98%   BMI 16.10 kg/m² RDCS, RVT, RDMS   Ordering          Fay Sorto,        Kassie        [facility] Vascular  Physician         Giuliano Liang, MD      Physician           Readers                                                            Annie Roque MD  Procedure Type of Study:   Veins:Lower Extremities DVT Study, VASC EXTREMITY VENOUS DUPLEX BILATERAL. Vascular Sonographer Report  Indications for Study:Suspected pulmonary embolism. Additional Indications:Patient arrived in emergency room with shortness of breath. CTA Chest with contrast was positive for pulmonary embolism. Patient has a history of pancreatic cancer. Venous Duplex Scan: B-mode imaging of the deep and superficial veins, with compression maneuvers, including color and Doppler spectral waveform analysis. Impressions Right Impression Limited visualization of calf veins due to body habitus and patient positioning. There is acute deep venous thrombosis involving the posterior tibial and peroneal veins. The greater saphenous vein has a thickened wall. Within the limits of this exam, there is no other evidence of deep or superficial venous thrombosis of the lower right extremity. Left Impression Limited visualization of the calf veins due to body habitus and patient positioning. There is acute deep venous thrombosis of the posterior tibial and peroneal veins. There is chronic superficial venous thrombosis of the proximal greater saphenous vein at the junction of the common femoral vein. Within the limits of this exam, there is no other evidence of deep or superficial venous thrombosis of the lower left extremity. There are no prior exams for comparison. Conclusions   Summary   Limited visualization of calf veins due to body habitus and patient  positioning. There is acute deep venous thrombosis involving the bilateral posterior  tibial and peroneal veins.   There is chronic superficial venous thrombosis of the left proximal greater  saphenous vein at the junction of the common femoral vein. Within the limits of this exam, there is no other evidence of deep or  superficial venous thrombosis of the bilateral lower extremities. Signature   ------------------------------------------------------------------  Electronically signed by Andreea Treviño MD (Interpreting  physician) on 09/07/2018 at 06:48 PM  ------------------------------------------------------------------  Patient Status:Routine. Study [address] [place] [address] - Vascular Lab. Technical Quality:Limited visualization due to body habitus. - Results were reported to:CARIN Corona at 11:20 AM on 09/07/2018. Risk Factors   - The patient's risk factor(s) include: dyslipidemia and controlled arterial     hypertension.   - The patient has a former tobacco history. - The patient has cancer. Velocities are measured in cm/s ; Diameters are measured in mm Right Lower Extremities DVT Study Measurements Right 2D Measurements     Problem List  Patient Active Problem List   Diagnosis    Biliary dyskinesia    Sprain of medial collateral ligament of left knee    Acute pain of right knee    Meniscus, medial, derangement    Hypertension, essential    Hyperlipidemia, mixed    GERD (gastroesophageal reflux disease) without esophagitis    ESLINA (generalized anxiety disorder)    Insomnia, psychophysiological    Hyperbilirubinemia    Malignant neoplasm of pancreas ([address]    Malignant neoplasm of head of pancreas ([address]    SBO (small bowel obstruction) (HCC)    Moderate malnutrition ([address]    Cachexia ([address]    Major depressive disorder without prior episode, current, moderate (HCC)    SOB (shortness of breath)       ASSESSMENT AND PLAN        Pancreatic cancer - Stage IV  S/p whipple and liver resection   Now with new lesions in liver await MRI result .   Will need biopsy for diagnosis       Anxiety / depression recent increase in effexor will order

## 2018-09-08 NOTE — PLAN OF CARE
Problem: Falls - Risk of:  Goal: Will remain free from falls  Will remain free from falls   Outcome: Ongoing  Patient is alert and oriented x 4 and has remained free of falls and will continue to remain free of falls. Patient is in bed with the bed alarm activated. Call light and belongings are within reach. RN will continue to assess.

## 2018-09-08 NOTE — PLAN OF CARE
Problem: Falls - Risk of:  Goal: Will remain free from falls  Will remain free from falls   Outcome: Ongoing  Pt has been free from falls this shift, bed alarm on, bed in lowest position, 2/4 side rails up, nonskid socks on, wheels locked, bedside table and call light in reach. Encouraged pt to call out if needed anything. Problem: Nausea/Vomiting:  Goal: Absence of nausea/vomiting  Absence of nausea/vomiting   Outcome: Ongoing  Pt denies nausea/vomiting at this time. RN encouraged pt to call out if needed anything. Will continue to monitor.

## 2018-09-09 LAB
ANTI-XA UNFRAC HEPARIN: 0.36 IU/ML (ref 0.3–0.7)
HCT VFR BLD CALC: 27.8 % (ref 40.5–52.5)
HEMOGLOBIN: 9.5 G/DL (ref 13.5–17.5)
MCH RBC QN AUTO: 37.8 PG (ref 26–34)
MCHC RBC AUTO-ENTMCNC: 34.2 G/DL (ref 31–36)
MCV RBC AUTO: 110.5 FL (ref 80–100)
PDW BLD-RTO: 13.8 % (ref 12.4–15.4)
PLATELET # BLD: 114 K/UL (ref 135–450)
PMV BLD AUTO: 8 FL (ref 5–10.5)
RBC # BLD: 2.51 M/UL (ref 4.2–5.9)
WBC # BLD: 4.7 K/UL (ref 4–11)

## 2018-09-09 PROCEDURE — 6370000000 HC RX 637 (ALT 250 FOR IP): Performed by: INTERNAL MEDICINE

## 2018-09-09 PROCEDURE — 85027 COMPLETE CBC AUTOMATED: CPT

## 2018-09-09 PROCEDURE — 2500000003 HC RX 250 WO HCPCS: Performed by: INTERNAL MEDICINE

## 2018-09-09 PROCEDURE — 1200000000 HC SEMI PRIVATE

## 2018-09-09 PROCEDURE — 36415 COLL VENOUS BLD VENIPUNCTURE: CPT

## 2018-09-09 PROCEDURE — 6360000002 HC RX W HCPCS: Performed by: INTERNAL MEDICINE

## 2018-09-09 PROCEDURE — 2580000003 HC RX 258: Performed by: INTERNAL MEDICINE

## 2018-09-09 PROCEDURE — 94760 N-INVAS EAR/PLS OXIMETRY 1: CPT

## 2018-09-09 PROCEDURE — 85520 HEPARIN ASSAY: CPT

## 2018-09-09 PROCEDURE — S0028 INJECTION, FAMOTIDINE, 20 MG: HCPCS | Performed by: INTERNAL MEDICINE

## 2018-09-09 RX ADMIN — Medication 10 ML: at 07:56

## 2018-09-09 RX ADMIN — METOCLOPRAMIDE 10 MG: 5 INJECTION, SOLUTION INTRAMUSCULAR; INTRAVENOUS at 13:59

## 2018-09-09 RX ADMIN — METOCLOPRAMIDE 10 MG: 5 INJECTION, SOLUTION INTRAMUSCULAR; INTRAVENOUS at 00:58

## 2018-09-09 RX ADMIN — HEPARIN SODIUM AND DEXTROSE 20 UNITS/KG/HR: 10000; 5 INJECTION INTRAVENOUS at 04:17

## 2018-09-09 RX ADMIN — FAMOTIDINE 20 MG: 10 INJECTION, SOLUTION INTRAVENOUS at 07:56

## 2018-09-09 RX ADMIN — MEGESTROL ACETATE 400 MG: 40 SUSPENSION ORAL at 19:46

## 2018-09-09 RX ADMIN — CHOLECALCIFEROL TAB 25 MCG (1000 UNIT) 1000 UNITS: 25 TAB at 19:46

## 2018-09-09 RX ADMIN — HYDROCHLOROTHIAZIDE 25 MG: 25 TABLET ORAL at 07:56

## 2018-09-09 RX ADMIN — METOCLOPRAMIDE 10 MG: 5 INJECTION, SOLUTION INTRAMUSCULAR; INTRAVENOUS at 19:02

## 2018-09-09 RX ADMIN — METOCLOPRAMIDE 10 MG: 5 INJECTION, SOLUTION INTRAMUSCULAR; INTRAVENOUS at 07:23

## 2018-09-09 RX ADMIN — DOXAZOSIN 8 MG: 4 TABLET ORAL at 07:55

## 2018-09-09 RX ADMIN — PANTOPRAZOLE SODIUM 40 MG: 40 TABLET, DELAYED RELEASE ORAL at 07:23

## 2018-09-09 RX ADMIN — ATORVASTATIN CALCIUM 40 MG: 20 TABLET, FILM COATED ORAL at 07:55

## 2018-09-09 RX ADMIN — VENLAFAXINE HYDROCHLORIDE 150 MG: 150 CAPSULE, EXTENDED RELEASE ORAL at 07:56

## 2018-09-09 RX ADMIN — MEGESTROL ACETATE 400 MG: 40 SUSPENSION ORAL at 07:55

## 2018-09-09 ASSESSMENT — PAIN SCALES - GENERAL: PAINLEVEL_OUTOF10: 0

## 2018-09-09 NOTE — PROGRESS NOTES
[facility]  [facility]  Progress Note      SUBJECTIVE:   Still can  not see MRI results    Discussed getting biopsy on Monday for diagnosis of relapse    SOB improved   OBJECTIVE      Medications    Current Facility-Administered Medications: iohexol (OMNIPAQUE 240) injection 50 mL, 50 mL, Oral, ONCE PRN  magic (miracle) mouthwash with nystatin, 10 mL, Swish & Swallow, PRN  metoclopramide (REGLAN) injection 10 mg, 10 mg, Intravenous, Q6H  heparin 25,000 units in dextrose 5% 250 mL infusion, 20 Units/kg/hr (Order-Specific), Intravenous, Continuous  atorvastatin (LIPITOR) tablet 40 mg, 40 mg, Oral, Daily  vitamin D (CHOLECALCIFEROL) tablet 1,000 Units, 1,000 Units, Oral, Nightly  docusate sodium (COLACE) capsule 100 mg, 100 mg, Oral, BID PRN  doxazosin (CARDURA) tablet 8 mg, 8 mg, Oral, Daily  megestrol (MEGACE) 40 MG/ML suspension 400 mg, 400 mg, Oral, BID  pantoprazole (PROTONIX) tablet 40 mg, 40 mg, Oral, QAM AC  venlafaxine (EFFEXOR XR) extended release capsule 150 mg, 150 mg, Oral, Daily  zolpidem (AMBIEN) tablet 5 mg, 5 mg, Oral, Nightly PRN  sodium chloride flush 0.9 % injection 10 mL, 10 mL, Intravenous, 2 times per day  sodium chloride flush 0.9 % injection 10 mL, 10 mL, Intravenous, PRN  magnesium hydroxide (MILK OF MAGNESIA) 400 MG/5ML suspension 30 mL, 30 mL, Oral, Daily PRN  ondansetron (ZOFRAN) injection 4 mg, 4 mg, Intravenous, Q6H PRN  hydrochlorothiazide (HYDRODIURIL) tablet 25 mg, 25 mg, Oral, Daily  0.9 % sodium chloride infusion, , Intravenous, Continuous  famotidine (PEPCID) injection 20 mg, 20 mg, Intravenous, Daily  heparin (porcine) injection 4,200 Units, 80 Units/kg (Order-Specific), Intravenous, PRN  heparin (porcine) injection 2,100 Units, 40 Units/kg (Order-Specific), Intravenous, PRN  Physical    VITALS:  /83   Pulse 76   Temp 98.3 °F (36.8 °C) (Oral)   Resp 16   Ht 5' 10.87\" (1.8 m)   Wt 115 lb (52.2 kg)   SpO2 97%   BMI 16.10 kg/m²   TEMPERATURE:  Current - Temp: are identified. The main pulmonary  arteries are not dilated. Heart size is normal. There are coronary artery calcifications. There is no evidence of right heart strain. There is no pericardial effusion. There is mild atelectasis within the periphery of the right lower lobe. There is a 0.5 cm subpleural nodule within the left lower lobe (axial image 142), increased in size. There is no pleural fluid. There is no mediastinal, hilar, or axillary lymphadenopathy. Limited images through the upper abdomen demonstrate changes from prior pancreatectomy. There is a 3.0 x 3.0 cm mass encasing the hepatic artery, slightly increased from 5/3/2018. There is gas within the biliary tract secondary to the prior surgery. Within the medial segment of left lobe of liver, near the hepatorenal fossa, there is a 2.2 cm elliptical low-attenuation lesion, increased in size. There is a small amount of perihepatic ascites. There is a small amount of ascites surrounding the spleen. There is no bony destructive process. 1. Moderate right pulmonary emboli. Results were called to the emergency room at time of dictation. 2. 0.5 cm subpleural nodule within the left lower lobe, increased in size, consistent with a pulmonary metastasis. 3. Prior Whipple procedure. 3.0 cm mass encasing the hepatic artery, slightly increased in size from 5/3/2018. 4. 2.2 cm low-attenuation lesion within medial segment of left lobe of liver, most likely representing a hepatic metastasis. 5. Small amount of perihepatic and perisplenic ascites.     Vl Extremity Venous Bilateral    Result Date: 9/7/2018  Lower Extremities DVT Study  Demographics   Patient Name      NULL Nilda Hamman   Date of Study     09/07/2018          Gender              Male   Patient Number    [id number]          Date of Birth       1955   Visit Number      [id number]           Age                 63 year(s)   Accession Number  [id number]           Room Number         4688   Corporate ID [id number]            [facility], [place],                                                            RDCS, RVT, RDMS   Ordering          Wilson Peralta        Interpreting        [facility] Vascular  Physician         Jojo Swanson MD      Physician           Readers                                                            Bertha Recinos MD  Procedure Type of Study:   Veins:Lower Extremities DVT Study, VASC EXTREMITY VENOUS DUPLEX BILATERAL. Vascular Sonographer Report  Indications for Study:Suspected pulmonary embolism. Additional Indications:Patient arrived in emergency room with shortness of breath. CTA Chest with contrast was positive for pulmonary embolism. Patient has a history of pancreatic cancer. Venous Duplex Scan: B-mode imaging of the deep and superficial veins, with compression maneuvers, including color and Doppler spectral waveform analysis. Impressions Right Impression Limited visualization of calf veins due to body habitus and patient positioning. There is acute deep venous thrombosis involving the posterior tibial and peroneal veins. The greater saphenous vein has a thickened wall. Within the limits of this exam, there is no other evidence of deep or superficial venous thrombosis of the lower right extremity. Left Impression Limited visualization of the calf veins due to body habitus and patient positioning. There is acute deep venous thrombosis of the posterior tibial and peroneal veins. There is chronic superficial venous thrombosis of the proximal greater saphenous vein at the junction of the common femoral vein. Within the limits of this exam, there is no other evidence of deep or superficial venous thrombosis of the lower left extremity. There are no prior exams for comparison. Conclusions   Summary   Limited visualization of calf veins due to body habitus and patient  positioning.   There is acute deep venous thrombosis

## 2018-09-09 NOTE — PROGRESS NOTES
Lab called about anti-xa level that was timed for 0330. Samantha Han in lab told this nurse that draw was collected at 1334. When following up one hour later, this nurse was told that lab was collected and then cancelled. Stat anti-xa ordered for patient. Will continue to monitor.

## 2018-09-09 NOTE — PROGRESS NOTES
junction of the common femoral vein.   Within the limits of this exam, there is no other evidence of deep or   superficial venous thrombosis of the bilateral lower extremities. CT Abd 9/8/2018: Impression:          1. Small new liver lesions consistent with hepatic metastatic disease. 2. Tumor encasement of the main hepatic artery. 3. Necrotic retroperitoneal lymph node consistent with metastatic disease. 4. Subpleural pulmonary infarcts are identified in the right lower lobe, in the setting of recently diagnosed pulmonary emboli. Assessment & Plan:      Acute right sided PE:  2/2 le dvt in the setting of malignancy. Started on heparin gtt per heme/ onc- cont for now. Hold in am for possible procedure- per heme/onc ok to transition to xarelto after procedure and dc on xarelto. Liver lesions:  W/ underlying hx of pancreatic ca s/p whipple- possibly recurrent w/ mets. Await MRI- surg and heme/onc following and recommend Liver bx- will make npo past midnight and consult IR for liver bx in am.    Hiccups:  Possible diaphragmatic irritation started on reglan w/ improvement- continue for now. The patient and wife at bedside were informed of the results of any tests, a time was given to answer questions, a plan was proposed and they agreed with plan. Disposition: inpatient.     Full Code

## 2018-09-10 ENCOUNTER — APPOINTMENT (OUTPATIENT)
Dept: CT IMAGING | Age: 63
DRG: 175 | End: 2018-09-10
Payer: COMMERCIAL

## 2018-09-10 PROBLEM — I26.99 PE (PULMONARY THROMBOEMBOLISM) (HCC): Status: ACTIVE | Noted: 2018-09-10

## 2018-09-10 PROBLEM — C78.7 LIVER METASTASES (HCC): Status: ACTIVE | Noted: 2018-09-10

## 2018-09-10 PROBLEM — I82.403 DVT, BILATERAL LOWER LIMBS (HCC): Status: ACTIVE | Noted: 2018-09-10

## 2018-09-10 PROCEDURE — 6370000000 HC RX 637 (ALT 250 FOR IP): Performed by: INTERNAL MEDICINE

## 2018-09-10 PROCEDURE — 2580000003 HC RX 258: Performed by: INTERNAL MEDICINE

## 2018-09-10 PROCEDURE — 88307 TISSUE EXAM BY PATHOLOGIST: CPT

## 2018-09-10 PROCEDURE — 2500000003 HC RX 250 WO HCPCS: Performed by: INTERNAL MEDICINE

## 2018-09-10 PROCEDURE — 99233 SBSQ HOSP IP/OBS HIGH 50: CPT | Performed by: SURGERY

## 2018-09-10 PROCEDURE — 2709999900 CT NEEDLE BIOPSY LIVER PERCUTANEOUS

## 2018-09-10 PROCEDURE — 6360000002 HC RX W HCPCS: Performed by: RADIOLOGY

## 2018-09-10 PROCEDURE — 0FB13ZX EXCISION OF RIGHT LOBE LIVER, PERCUTANEOUS APPROACH, DIAGNOSTIC: ICD-10-PCS | Performed by: RADIOLOGY

## 2018-09-10 PROCEDURE — 6360000002 HC RX W HCPCS: Performed by: INTERNAL MEDICINE

## 2018-09-10 PROCEDURE — S0028 INJECTION, FAMOTIDINE, 20 MG: HCPCS | Performed by: INTERNAL MEDICINE

## 2018-09-10 PROCEDURE — 1200000000 HC SEMI PRIVATE

## 2018-09-10 RX ORDER — MIDAZOLAM HYDROCHLORIDE 1 MG/ML
2 INJECTION INTRAMUSCULAR; INTRAVENOUS ONCE
Status: COMPLETED | OUTPATIENT
Start: 2018-09-10 | End: 2018-09-10

## 2018-09-10 RX ORDER — FENTANYL CITRATE 50 UG/ML
100 INJECTION, SOLUTION INTRAMUSCULAR; INTRAVENOUS ONCE
Status: COMPLETED | OUTPATIENT
Start: 2018-09-10 | End: 2018-09-10

## 2018-09-10 RX ADMIN — VENLAFAXINE HYDROCHLORIDE 150 MG: 150 CAPSULE, EXTENDED RELEASE ORAL at 08:08

## 2018-09-10 RX ADMIN — PANTOPRAZOLE SODIUM 40 MG: 40 TABLET, DELAYED RELEASE ORAL at 06:06

## 2018-09-10 RX ADMIN — ZOLPIDEM TARTRATE 5 MG: 5 TABLET ORAL at 00:38

## 2018-09-10 RX ADMIN — Medication 10 ML: at 20:41

## 2018-09-10 RX ADMIN — Medication 10 ML: at 08:08

## 2018-09-10 RX ADMIN — METOCLOPRAMIDE 10 MG: 5 INJECTION, SOLUTION INTRAMUSCULAR; INTRAVENOUS at 00:38

## 2018-09-10 RX ADMIN — FENTANYL CITRATE 100 MCG: 50 INJECTION INTRAMUSCULAR; INTRAVENOUS at 15:15

## 2018-09-10 RX ADMIN — MIDAZOLAM HYDROCHLORIDE 2 MG: 1 INJECTION, SOLUTION INTRAMUSCULAR; INTRAVENOUS at 15:15

## 2018-09-10 RX ADMIN — METOCLOPRAMIDE 10 MG: 5 INJECTION, SOLUTION INTRAMUSCULAR; INTRAVENOUS at 06:06

## 2018-09-10 RX ADMIN — RIVAROXABAN 15 MG: 15 TABLET, FILM COATED ORAL at 16:02

## 2018-09-10 RX ADMIN — DOXAZOSIN 8 MG: 4 TABLET ORAL at 08:08

## 2018-09-10 RX ADMIN — METOCLOPRAMIDE 10 MG: 5 INJECTION, SOLUTION INTRAMUSCULAR; INTRAVENOUS at 18:10

## 2018-09-10 RX ADMIN — MEGESTROL ACETATE 400 MG: 40 SUSPENSION ORAL at 20:39

## 2018-09-10 RX ADMIN — HYDROCHLOROTHIAZIDE 25 MG: 25 TABLET ORAL at 08:08

## 2018-09-10 RX ADMIN — CHOLECALCIFEROL TAB 25 MCG (1000 UNIT) 1000 UNITS: 25 TAB at 20:39

## 2018-09-10 RX ADMIN — ATORVASTATIN CALCIUM 40 MG: 20 TABLET, FILM COATED ORAL at 08:08

## 2018-09-10 RX ADMIN — ZOLPIDEM TARTRATE 5 MG: 5 TABLET ORAL at 20:40

## 2018-09-10 RX ADMIN — METOCLOPRAMIDE 10 MG: 5 INJECTION, SOLUTION INTRAMUSCULAR; INTRAVENOUS at 12:52

## 2018-09-10 RX ADMIN — FAMOTIDINE 20 MG: 10 INJECTION, SOLUTION INTRAVENOUS at 08:08

## 2018-09-10 RX ADMIN — MEGESTROL ACETATE 400 MG: 40 SUSPENSION ORAL at 08:08

## 2018-09-10 ASSESSMENT — PAIN SCALES - GENERAL
PAINLEVEL_OUTOF10: 0
PAINLEVEL_OUTOF10: 0

## 2018-09-10 NOTE — CONSULTS
[facility]    Consult Note      Requesting Physician:     CHIEF COMPLAINT: Dyspnea      HISTORY OF PRESENT ILLNESS:      Mr. Jada Palacios  is a 63 y.o. male we are seeing in consultation for Stage IV pancreatic adenocarcinoma. He had no acute events overnight. He is feeling well this am. His nausea has improved. He hasn't vomited in over 24 hrs. He had 3 full meals yesterday and had a good appetite. He tolerated po intake nicely. He denies chest pain and shortness of breath. Heparin gtt stopped for liver biopsy today. Pt has been NPO since midnight, as well. MRI shows irregular soft tissue mass near Whipple resection bed, tumor encasing the hepatic artery, multiple hepatic lesions, metastatic SMA axis lymphadenopathy, metastatic peritoneal nodule in RUQ and scattered vertebral body lesions. CT abd/pel show small new liver lesions and necrotic retroperitoneal LN. Informed pt that recurrence is shown on the imaging. He became tearful.      Past Medical History:        Diagnosis Date    Anxiety     Arthritis     back and toe    Depression     GERD (gastroesophageal reflux disease)     Hyperlipidemia     Hypertension     Pancreatic cancer ([facility] [address] 7/17/2017     Past Surgical History:        Procedure Laterality Date    ABDOMINAL EXPLORATION SURGERY  11/01/2017    EXPLORATORY LAPAROTOMY, PANCREATICODUODENECTOMY, LIVER LESION RESECTION, PARTIAL PORTAL VEIN DISSECTION     ACHILLES TENDON SURGERY Right 2007 (approximate)    APPENDECTOMY  7 29 11    CHOLECYSTECTOMY, LAPAROSCOPIC  7 29 11    COLONOSCOPY      ENDOSCOPY, COLON, DIAGNOSTIC      ERCP      LAPAROSCOPY  07/19/2017    diagnostic laparoscopy    LEG TENDON SURGERY      right leg    UPPER GASTROINTESTINAL ENDOSCOPY N/A 06/30/2017    with biiopsy       Current Medications:    Current Facility-Administered Medications: rivaroxaban (XARELTO) tablet 20 mg, 20 mg, Oral, Daily  iohexol (OMNIPAQUE 240) injection 50 mL, 50 mL, Oral, Family History:     Family History   Problem Relation Age of Onset    Cancer Mother         breast    Asthma Mother     Other Father         perforating gastric ulcer-  age 68    Heart Disease Father     Depression Brother     Substance Abuse Brother     Diabetes Maternal Grandmother     Cancer Maternal Grandfather         colon- dx'd late 66's    Other Brother         atherosclerosis    Cancer Paternal Grandfather      REVIEW OF SYSTEMS:    ROS per the HPI   Otherwise  10 point ROS negative     PHYSICAL EXAM:      Vitals:  /82   Pulse 98   Temp 98.4 °F (36.9 °C) (Oral)   Resp 18   Ht 5' 10.87\" (1.8 m)   Wt 115 lb (52.2 kg)   SpO2 97%   BMI 16.10 kg/m²     CONSTITUTIONAL:  awake, alert, cooperative, no apparent distress, and appears stated age NAD  EYES:  pupils equal, round and reactive to light, extra ocular muscles intact, sclera clear, conjunctiva normal  NECK:  Supple, symmetrical, trachea midline, no adenopathy, thyroid symmetric, not enlarged and no tenderness, skin normal  HEMATOLOGIC/LYMPHATICS:  no cervical lymphadenopathy, no supraclavicular lymphadenopathy, no axillary lymphadenopathy and no inguinal lymphadenopathy  LUNGS:  No increased work of breathing, good air exchange, clear to auscultation bilaterally, no crackles or wheezing  CARDIOVASCULAR:  , regular rate and rhythm, normal S1 and S2, no S3 or S4, and no murmur noted  ABDOMEN:  No scars, normal bowel sounds, soft, non-distended, non-tender, no masses palpated, no hepatosplenomegally      MUSCULOSKELETAL:  There is no redness, warmth, or swelling of the joints. Full range of motion noted. Motor strength is 5 out of 5 all extremities bilaterally. NEUROLOGIC:  Awake, alert, oriented to name, place and time. Cranial nerves II-XII are grossly intact. Motor is 5 out of 5 bilaterally. SKIN:  no bruising or bleeding      DATA:    PT/INR:  No results for input(s): PROT, INR in the last 72 hours.   PTT:  No results for input(s): APTT in the last 72 hours. CMP:    Lab Results   Component Value Date     09/07/2018    K 3.1 09/07/2018     09/07/2018    CO2 23 09/07/2018    BUN 10 09/07/2018    PROT 6.9 11/12/2017    PROT 7.5 08/24/2017     Magnesium:    Lab Results   Component Value Date    MG 2.20 09/07/2018     Phosphorus:  No components found for: PO4  Calcium:  No components found for: CA  CBC:    Lab Results   Component Value Date    WBC 4.7 09/09/2018    RBC 2.51 09/09/2018    RBC 3.44 08/24/2017    HGB 9.5 09/09/2018    HCT 27.8 09/09/2018    .5 09/09/2018    RDW 13.8 09/09/2018     09/09/2018     DIFF:    Lab Results   Component Value Date    .5 09/09/2018    RDW 13.8 09/09/2018      LDH:  @labcrnt(LDH)@  Uric Acid:  @labcrnt(URIC)@    Radiology Review: Xr Chest Standard (2 Vw)    Result Date: 9/6/2018  PA and lateral chest HISTORY: Dyspnea COMPARISON: Chest x-ray from 7/19/2017 FINDINGS: Heart size is normal with normal pulmonary vascularity. The thoracic aorta is mildly tortuous. There is minimal airspace disease within right lower lobe, in region of right costophrenic angle. Left lung is clear. There is no pleural fluid. There are old, healed rib fractures. CT PowerPort remains in place via right subclavian vein. Minimal airspace disease within the right lower lobe (atelectasis versus pneumonia). Xr Ankle Right (min 3 Views)    Result Date: 9/6/2018  Right ankle HISTORY: Swelling FINDINGS: 3 views were obtained. There is mild soft tissue swelling. The ankle mortise is intact. There is no bony destructive process or fracture. There is mild bony spurring on the superior surface of tarsal bones. 1. Mild soft tissue swelling. 2. Mild bony spurring on the superior surface of tarsal bones.     Ct Head Wo Contrast    Result Date: 9/6/2018  Cranial computed tomography (without IV contrast) HISTORY: Acute neuro deficit Technical factors: Spiral axial CT imaging performed without IV contrast. Multiplanar reformatting performed. CT dose reduction techniques were utilized. COMPARISON: None FINDINGS: There is no intracranial or subarachnoid hemorrhage on this noncontrast study. There are no focal brain lesions. There is no midline mass effect or extra-axial collections. The ventricular system is normal. The bony calvarium is intact. Normal noncontrast cranial computed tomography. Mri Abdomen W Wo Contrast    Result Date: 9/10/2018  MRI ABDOMEN W WO CONTRAST Indication: Stage IV pancreatic cancer. Recurrence on CT. Comparison: CT abdomen 9/8/18 and prior studies. Technique:  Multiplanar multisequence MR imaging of the abdomen without and with intravenous contrast. 6 mL Eovist IV. Findings:  Liver: Multiple T2 hyperintense lesions, new from previous CT studies, consistent with metastases. For example, 1.2 x 1.3 cm lesion in segment STEFFANIE (image 8); 1.0 cm lesion high in segment VIII (image 4) 1.3 x 0.9 cm laterally between segments V and VI (image 14). Subcapsular signal loss posterior in the right hepatic lobe is compatible with focal steatosis. There is irregular enhancement along the liver capsule just inferior to the gallbladder fossa representing metastasis. Gallbladder and biliary tree: Surgically absent gallbladder post Whipple. Susceptibility artifact in the biliary tree from pneumobilia. Pancreas: There is ill-defined soft tissue attenuation near the Whipple surgical bed and most conspicuous on diffusion-weighted images measuring approximately 1.8 x 2.9 cm. This most likely corresponds to soft tissue encasing the hepatic artery on recent  CT. Spleen: Normal. Adrenals: Normal. Kidneys: Symmetric enhancement. No hydronephrosis or mass. Other: Heterogeneously enhancing peritoneal tumor deposit in the right upper quadrant anterior to the right kidney measuring 1.7 x 2.0 cm (image 40). 2.3 x 2.0 necrotic SMA axis lymph node (image 47).  There are scattered T2 hyperintense lesions in T10, L1 and L2 vertebral bodies, measuring up to 1 cm. The L1 lesion is enhancing. Patchy right lower lobe opacities, as on recent CT. 1.  Irregular soft tissue mass near the Whipple resection bed likely corresponds to recurrent tumor encasing the hepatic artery on recent CT. 2.  Multiple hepatic lesions, consistent with metastases. 3.  Metastatic SMA axis lymphadenopathy. 4.  Metastatic peritoneal nodule in the right upper quadrant. 5.  Scattered vertebral body lesions, suspicious for metastases. Ct Abdomen Pelvis W Iv Contrast Additional Contrast? Oral    Result Date: 9/8/2018  EXAM: CT ABDOMEN AND PELVIS WITH CONTRAST INDICATION: liver mets , anc cancer, Pain COMPARISON: CT abdomen pelvis dated November 14, 2017. CTA chest dated September 6, 2018. TECHNIQUE: Axial CT imaging obtained from lung bases through pelvis. Axial images and multiplanar reformatted images are provided for review. Up-to-date CT equipment and radiation dose reduction techniques were employed. IV Contrast: 100 cc Omnipaque 350 Oral Contrast: Yes. FINDINGS: There are small subpleural nodular opacities identified in the right lower lobe which likely reflect pulmonary infarcts, given recent diagnosis of multiple pulmonary emboli. The patient is status post Whipple procedure. Pneumobilia is evident. There are new lesions within the liver which are ill-defined and hypodense. For example in the right hepatic lobe there is a lesion measuring 14 x 12 mm (image 41, series 2). An additional lesion in the left hepatic lobe measuring 10 x 7 mm (image 26, series 2). There is soft tissue encasement of the main hepatic artery consistent with tumor. This is best seen on image 50 of series 2). There is symmetric renal enhancement. The spleen and adrenal glands and residual pancreas are unremarkable. There is a low-attenuation lymph node adjacent to the superior mesenteric artery which measures 22 x 19 mm (image 58, series 2).  There is mild thickening of the cecum with adjacent fat stranding. There is no evidence of free intraperitoneal air. 1. Small new liver lesions consistent with hepatic metastatic disease. 2. Tumor encasement of the main hepatic artery. 3. Necrotic retroperitoneal lymph node consistent with metastatic disease. 4. Subpleural pulmonary infarcts are identified in the right lower lobe, in the setting of recently diagnosed pulmonary emboli. Cta Chest W Contrast    Result Date: 9/6/2018  CT angiography of the chest (with IV contrast) HISTORY: Dyspnea, prior Whipple procedure for pancreatic carcinoma TECHNICAL FACTORS: Spiral CT imaging performed with 80 mL of Isovue-370. Maximum intensity projection reformatting performed. CT dose reduction techniques were utilized. COMPARISON: CT of chest, abdomen, pelvis from 5/3/2018 FINDINGS: There are pulmonary emboli involving the right upper and right lower lobe pulmonary artery branches. There are pulmonary emboli within the interlobar portion of right pulmonary artery. No left pulmonary emboli are identified. The main pulmonary  arteries are not dilated. Heart size is normal. There are coronary artery calcifications. There is no evidence of right heart strain. There is no pericardial effusion. There is mild atelectasis within the periphery of the right lower lobe. There is a 0.5 cm subpleural nodule within the left lower lobe (axial image 142), increased in size. There is no pleural fluid. There is no mediastinal, hilar, or axillary lymphadenopathy. Limited images through the upper abdomen demonstrate changes from prior pancreatectomy. There is a 3.0 x 3.0 cm mass encasing the hepatic artery, slightly increased from 5/3/2018. There is gas within the biliary tract secondary to the prior surgery. Within the medial segment of left lobe of liver, near the hepatorenal fossa, there is a 2.2 cm elliptical low-attenuation lesion, increased in size. There is a small amount of perihepatic ascites.  There is a small amount of ascites surrounding the spleen. There is no bony destructive process. 1. Moderate right pulmonary emboli. Results were called to the emergency room at time of dictation. 2. 0.5 cm subpleural nodule within the left lower lobe, increased in size, consistent with a pulmonary metastasis. 3. Prior Whipple procedure. 3.0 cm mass encasing the hepatic artery, slightly increased in size from 5/3/2018. 4. 2.2 cm low-attenuation lesion within medial segment of left lobe of liver, most likely representing a hepatic metastasis. 5. Small amount of perihepatic and perisplenic ascites. Vl Extremity Venous Bilateral    Result Date: 9/7/2018  Lower Extremities DVT Study  Demographics   Patient Name      MARU Hewitt   Date of Study     09/07/2018          Gender              Male   Patient Number    [id number]          Date of Birth       1955   Visit Number      [id number]           Age                 63 year(s)   Accession Number  [id number]           Room Number         3085   Corporate ID      [id number]            Sonographer         Kylee Guillaume, RVT, RDMS   Ordering          Darshan Garg        Interpreting        [facility]  Physician         Lisa Easley MD      Physician           Readers                                                            Jose Thao MD  Procedure Type of Study:   Veins:Lower Extremities DVT Study, VASC EXTREMITY VENOUS DUPLEX BILATERAL. Vascular Sonographer Report  Indications for Study:Suspected pulmonary embolism. Additional Indications:Patient arrived in emergency room with shortness of breath. CTA Chest with contrast was positive for pulmonary embolism. Patient has a history of pancreatic cancer.  Venous Duplex Scan: B-mode imaging of the deep and superficial veins, with compression maneuvers, including color and Doppler spectral waveform analysis. Impressions Right Impression Limited visualization of calf veins due to body habitus and patient positioning. There is acute deep venous thrombosis involving the posterior tibial and peroneal veins. The greater saphenous vein has a thickened wall. Within the limits of this exam, there is no other evidence of deep or superficial venous thrombosis of the lower right extremity. Left Impression Limited visualization of the calf veins due to body habitus and patient positioning. There is acute deep venous thrombosis of the posterior tibial and peroneal veins. There is chronic superficial venous thrombosis of the proximal greater saphenous vein at the junction of the common femoral vein. Within the limits of this exam, there is no other evidence of deep or superficial venous thrombosis of the lower left extremity. There are no prior exams for comparison. Conclusions   Summary   Limited visualization of calf veins due to body habitus and patient  positioning. There is acute deep venous thrombosis involving the bilateral posterior  tibial and peroneal veins. There is chronic superficial venous thrombosis of the left proximal greater  saphenous vein at the junction of the common femoral vein. Within the limits of this exam, there is no other evidence of deep or  superficial venous thrombosis of the bilateral lower extremities. Signature   ------------------------------------------------------------------  Electronically signed by Adán Brunner MD (Interpreting  physician) on 09/07/2018 at 06:48 PM  ------------------------------------------------------------------  Patient Status:Routine. Study [address] [place] [address] - Vascular Lab. Technical Quality:Limited visualization due to body habitus. - Results were reported to:CARIN Corona at 11:20 AM on 09/07/2018.  Risk Factors   - The patient's risk factor(s) include: dyslipidemia and controlled arterial     hypertension.   - The patient has a

## 2018-09-10 NOTE — PROGRESS NOTES
rash  Neurologic: Mental status: Alert, oriented, thought content appropriate  Motor:grossly normal      LABS:    CBC:   Recent Labs      09/09/18   1638   WBC  4.7   HGB  9.5*   HCT  27.8*   MCV  110.5*   PLT  114*                                                                BMP:  No results for input(s): NA, K, CL, CO2, BUN, CREATININE, GLUCOSE in the last 72 hours. Invalid input(s):  CA,  PHOS    LFT's: No results for input(s): AST, ALT, ALB, BILITOT, ALKPHOS in the last 72 hours. Troponin: No results for input(s): TROPONINI in the last 72 hours. BNP: No results for input(s): BNP in the last 72 hours. Lipids: No results for input(s): CHOL, HDL in the last 72 hours. Invalid input(s): LDLCALCU    ABGs:   Lab Results   Component Value Date    PHART 7.35 11/02/2017    BXO0PJL 43 11/02/2017    PO2ART 122.0 11/02/2017       INR: No results for input(s): INR in the last 72 hours. U/A:No results for input(s): NITRITE, COLORU, PHUR, LABCAST, WBCUA, RBCUA, MUCUS, TRICHOMONAS, YEAST, BACTERIA, CLARITYU, SPECGRAV, LEUKOCYTESUR, UROBILINOGEN, BILIRUBINUR, BLOODU, GLUCOSEU, AMORPHOUS in the last 72 hours. Invalid input(s): Maureen Morales   -----------------------------------------------------------------  RAD:   MRI ABDOMEN W WO CONTRAST   Final Result      1. Irregular soft tissue mass near the Whipple resection bed likely corresponds to recurrent tumor encasing the hepatic artery on recent CT. 2.  Multiple hepatic lesions, consistent with metastases. 3.  Metastatic SMA axis lymphadenopathy. 4.  Metastatic peritoneal nodule in the right upper quadrant. 5.  Scattered vertebral body lesions, suspicious for metastases. CT ABDOMEN PELVIS W IV CONTRAST Additional Contrast? Oral   Final Result      1. Small new liver lesions consistent with hepatic metastatic disease. 2. Tumor encasement of the main hepatic artery. 3. Necrotic retroperitoneal lymph node consistent with metastatic disease.    4. Hematology/Oncology following  - Start Xarelto as indicated above    Code Status: Full Code  FEN: General Diet  PPx: Paula Leak   Dispo:  Wards then Lola Madden MD  9/10/2018  1:38 PM  Pager:  [phone]

## 2018-09-10 NOTE — FLOWSHEET NOTE
IMAGING SERVICES NURSING PROGRESS NOTE    Procedure:  CT Guided Liver Biopsy  September 10, 2018  Rd Alvares      Allergies:  No Known Allergies    Vitals:    09/10/18 1515   BP: 117/79   Pulse: 90   Resp:    Temp:    SpO2: 100%       Recent lab work reviewed with MD: yes yes  Procedure explained to patient by MD: yes   Informed consent obtained:yes      Mental Status:  Normal  Readiness to learn:  Yes  Barriers to learning: No    Pain Assessment Pre-Procedure:  Pain Present:  no      Time out Procedure Verification with:  [x] RN  [x] Physician  [x] Patient  [x] Other: CT Technologist  Procedure site marked, if applicable:  Yes    Note: Patient arrived A & O x 4, denies pain, breathing easily on room air, Spoke to Dr. Tony Calderon prior to procedure. Procedural sedation:  Fentanyl: 100 mcg  Versed:  2  mg  Post Procedureal Note:  Patient tolerated procedure well. Biopsy of Liver done, no bleeding at stie, dsd applied. Breathing easly on room air. Report given to [facility]. Patient transported in stable conditon to room 5523. Pain Assessment Post-Procedure:  Pain Present:  no  Pain Score:  0      Plan of Care Goals:  Safety measures met:  Yes  Patient understands explanation of procedure:  Yes    Time in:14:23    Time out: 15:15  Sabrina Tejada R.N. 9/10/2018

## 2018-09-10 NOTE — PROGRESS NOTES
Biopsy site has remained clean, dry and intact. Patient denies pain at site. Will continue to monitor.

## 2018-09-11 ENCOUNTER — TELEPHONE (OUTPATIENT)
Dept: INTERNAL MEDICINE CLINIC | Age: 63
End: 2018-09-11

## 2018-09-11 VITALS
WEIGHT: 115 LBS | HEART RATE: 124 BPM | TEMPERATURE: 98.7 F | OXYGEN SATURATION: 97 % | BODY MASS INDEX: 16.1 KG/M2 | SYSTOLIC BLOOD PRESSURE: 118 MMHG | DIASTOLIC BLOOD PRESSURE: 83 MMHG | HEIGHT: 71 IN | RESPIRATION RATE: 17 BRPM

## 2018-09-11 LAB
HCT VFR BLD CALC: 30.6 % (ref 40.5–52.5)
HEMOGLOBIN: 10.3 G/DL (ref 13.5–17.5)
MCH RBC QN AUTO: 37.6 PG (ref 26–34)
MCHC RBC AUTO-ENTMCNC: 33.6 G/DL (ref 31–36)
MCV RBC AUTO: 112 FL (ref 80–100)
PDW BLD-RTO: 14 % (ref 12.4–15.4)
PLATELET # BLD: 147 K/UL (ref 135–450)
PMV BLD AUTO: 7.6 FL (ref 5–10.5)
RBC # BLD: 2.73 M/UL (ref 4.2–5.9)
WBC # BLD: 4.2 K/UL (ref 4–11)

## 2018-09-11 PROCEDURE — 85027 COMPLETE CBC AUTOMATED: CPT

## 2018-09-11 PROCEDURE — 6370000000 HC RX 637 (ALT 250 FOR IP): Performed by: INTERNAL MEDICINE

## 2018-09-11 PROCEDURE — 2500000003 HC RX 250 WO HCPCS: Performed by: INTERNAL MEDICINE

## 2018-09-11 PROCEDURE — 6360000002 HC RX W HCPCS: Performed by: INTERNAL MEDICINE

## 2018-09-11 PROCEDURE — 36415 COLL VENOUS BLD VENIPUNCTURE: CPT

## 2018-09-11 PROCEDURE — 2580000003 HC RX 258: Performed by: INTERNAL MEDICINE

## 2018-09-11 PROCEDURE — S0028 INJECTION, FAMOTIDINE, 20 MG: HCPCS | Performed by: INTERNAL MEDICINE

## 2018-09-11 RX ORDER — HYDROCHLOROTHIAZIDE 25 MG/1
25 TABLET ORAL DAILY
Qty: 30 TABLET | Refills: 3 | Status: SHIPPED | OUTPATIENT
Start: 2018-09-12 | End: 2018-09-11

## 2018-09-11 RX ORDER — HYDROCHLOROTHIAZIDE 25 MG/1
25 TABLET ORAL DAILY
Qty: 30 TABLET | Refills: 3 | Status: SHIPPED | OUTPATIENT
Start: 2018-09-12

## 2018-09-11 RX ADMIN — DOXAZOSIN 8 MG: 4 TABLET ORAL at 08:30

## 2018-09-11 RX ADMIN — MEGESTROL ACETATE 400 MG: 40 SUSPENSION ORAL at 08:30

## 2018-09-11 RX ADMIN — VENLAFAXINE HYDROCHLORIDE 150 MG: 150 CAPSULE, EXTENDED RELEASE ORAL at 08:30

## 2018-09-11 RX ADMIN — RIVAROXABAN 15 MG: 15 TABLET, FILM COATED ORAL at 07:55

## 2018-09-11 RX ADMIN — METOCLOPRAMIDE 10 MG: 5 INJECTION, SOLUTION INTRAMUSCULAR; INTRAVENOUS at 00:56

## 2018-09-11 RX ADMIN — Medication 10 ML: at 08:32

## 2018-09-11 RX ADMIN — HYDROCHLOROTHIAZIDE 25 MG: 25 TABLET ORAL at 08:30

## 2018-09-11 RX ADMIN — METOCLOPRAMIDE 10 MG: 5 INJECTION, SOLUTION INTRAMUSCULAR; INTRAVENOUS at 07:54

## 2018-09-11 RX ADMIN — FAMOTIDINE 20 MG: 10 INJECTION, SOLUTION INTRAVENOUS at 08:30

## 2018-09-11 RX ADMIN — ATORVASTATIN CALCIUM 40 MG: 20 TABLET, FILM COATED ORAL at 08:30

## 2018-09-11 RX ADMIN — PANTOPRAZOLE SODIUM 40 MG: 40 TABLET, DELAYED RELEASE ORAL at 07:54

## 2018-09-11 NOTE — PROGRESS NOTES
Surgery Progress Note              PATIENT NAME: Shweta Mccabe     TODAY'S DATE: 9/10/2018    SUBJECTIVE:    Pt and wife anxious about recurrence of cancer. Has good appetite. Waiting for liver biopsy. C/o back pain. No other complaints. Review of systems is otherwise negative    Past medical history, Past surgical history, Social history, Family history and allergies reviewed and updated. OBJECTIVE:   VITALS:    /81   Pulse 96   Temp 98.4 °F (36.9 °C) (Oral)   Resp 17   Ht 5' 10.87\" (1.8 m)   Wt 115 lb (52.2 kg)   SpO2 96%   BMI 16.10 kg/m²   TEMPERATURE:    Current - Temp: 98.4 °F (36.9 °C); Max - Temp  Av.3 °F (36.8 °C)  Min: 97.6 °F (36.4 °C)  Max: 98.7 °F (37.1 °C)  PULSE RANGE:   Pulse  Av.8  Min: 65  Max: 115  BLOOD PRESSURE RANGE:    Systolic (90KEU), TMD:200 , Min:107 , LHP:778   ; Diastolic (56SWU), NGQ:81, Min:68, Max:107    PULSE OXIMETRY RANGE:   SpO2  Av.4 %  Min: 95 %  Max: 100 %  RESPIRATIONS RANGE:   Resp  Av.7  Min: 17  Max: 18    INTAKE/OUTPUT:  In: 20 [P.O.:20]  Out: 800 [Urine:800]                                   Data Review:  CBC:   Recent Labs      18   1638   WBC  4.7   HGB  9.5*   HCT  27.8*   MCV  110.5*   PLT  114*     BMP: No results for input(s): NA, K, CL, CO2, PHOS, BUN, CREATININE in the last 72 hours. Invalid input(s): CA,  MG  Cardiac Enzymes: No results for input(s): CKTOTAL, CKMB, CKMBINDEX, TROPONINI in the last 72 hours. PT/INR: No results for input(s): PROTIME, INR in the last 72 hours. APTT: No results for input(s): APTT in the last 72 hours.     Physical Exam:  General appearance: alert, resting in bed  Neuro: A&Ox3, PERRL  Neck: trachea midline, no JVD  Lungs: CTAB, no r/r/w  Heart: RRR, no m/r/g  Abdomen: soft, non-tender, non-distended, no hepatosplenomegaly, +BS  Skin: pink, no rashes  Extremities: no edema, cap refill<2sec          ASSESSMENT AND PLAN:  63 y.o. male with recurrent pancreatic cancer  Will follow biopsy

## 2018-09-11 NOTE — DISCHARGE SUMMARY
CREATININE 0.6 09/07/2018    CALCIUM 8.0 09/07/2018    PHOS 5.1 11/09/2017    ALKPHOS 286 11/12/2017    ALT 33 11/12/2017    AST 17 11/12/2017    BILITOT <0.2 11/12/2017    BILIDIR <0.2 11/08/2017    LABALBU 3.4 11/12/2017    LDLCALC 75 02/13/2017    TRIG 172 02/13/2017     Lab Results   Component Value Date    INR 0.95 09/06/2018    INR 1.21 (H) 11/02/2017    INR 1.37 (H) 11/01/2017       Radiology:  CT NEEDLE BIOPSY LIVER PERCUTANEOUS   Final Result   Addendum 1 of 1   Addendum: By Dr. Taylor Altamirano of [facility]      Under sterile technique and following 2% lidocaine local anesthetic was    administered along the tract planned for liver biopsy. Final   1. Satisfactory uneventful CT-guided percutaneous hepatic core biopsy, right lobe. 2. Conscious sedation without complication      MRI ABDOMEN W WO CONTRAST   Final Result      1. Irregular soft tissue mass near the Whipple resection bed likely corresponds to recurrent tumor encasing the hepatic artery on recent CT. 2.  Multiple hepatic lesions, consistent with metastases. 3.  Metastatic SMA axis lymphadenopathy. 4.  Metastatic peritoneal nodule in the right upper quadrant. 5.  Scattered vertebral body lesions, suspicious for metastases. CT ABDOMEN PELVIS W IV CONTRAST Additional Contrast? Oral   Final Result      1. Small new liver lesions consistent with hepatic metastatic disease. 2. Tumor encasement of the main hepatic artery. 3. Necrotic retroperitoneal lymph node consistent with metastatic disease. 4. Subpleural pulmonary infarcts are identified in the right lower lobe, in the setting of recently diagnosed pulmonary emboli. VL Extremity Venous Bilateral   Final Result      CTA CHEST W CONTRAST   Final Result   1. Moderate right pulmonary emboli. Results were called to the emergency room at time of dictation.       2. 0.5 cm subpleural nodule within the left lower lobe, increased in size, consistent with a pulmonary malnutrition ([address]      venlafaxine (EFFEXOR XR) 150 MG extended release capsule Take 1 capsule by mouth daily  Qty: 90 capsule, Refills: 1    Associated Diagnoses: SELINA (generalized anxiety disorder); Malignant neoplasm of head of pancreas (HCC)      zolpidem (AMBIEN) 10 MG tablet TAKE 1 TABLET BY MOUTH NIGHTLY AS NEEDED FOR SLEEP  Qty: 90 tablet, Refills: 0    Associated Diagnoses: Primary insomnia      doxazosin (CARDURA) 8 MG tablet TAKE 1 TABLET BY MOUTH ONE TIME DAILY  Qty: 90 tablet, Refills: 0      !! atorvastatin (LIPITOR) 40 MG tablet TAKE ONE TABLET BY MOUTH ONE TIME A DAY  Qty: 90 tablet, Refills: 0      !! atorvastatin (LIPITOR) 40 MG tablet Take 1 tablet by mouth daily  Qty: 90 tablet, Refills: 1    Associated Diagnoses: Hyperlipidemia, mixed      docusate sodium (COLACE) 100 MG capsule Take 1 capsule by mouth 2 times daily as needed for Constipation  Qty: 28 capsule, Refills: 0      metoclopramide (REGLAN) 5 MG tablet Take 1 tablet by mouth 3 times daily  Qty: 120 tablet, Refills: 0      ondansetron (ZOFRAN) 8 MG tablet Take 1 tablet by mouth every 8 hours as needed for Nausea or Vomiting  Qty: 90 tablet, Refills: 3      b complex vitamins capsule Take 1 capsule by mouth nightly       Ascorbic Acid (VITAMIN C PO) Take 1 tablet by mouth nightly       Cholecalciferol (VITAMIN D3) 5000 UNITS TABS Take 1,000 Units by mouth nightly       lidocaine-prilocaine (EMLA) 2.5-2.5 % cream Apply topically a thick layer at least30 minutes prior to treatment and cover with plastic wrap  Qty: 30 g, Refills: 3       !! - Potential duplicate medications found. Please discuss with provider.         Current Discharge Medication List          Follow-up appointments:  one week    Provider Follow-up:    Dr. Jean Goetz (Oncologist)  Dr. Leta Orourke (PCP)    Condition at Discharge:  Good    The patient was seen and examined on day of discharge and this discharge summary is in conjunction with any daily progress note from day of discharge. Time spent on discharge is more than 30 minutes in the examination, evaluation, counseling and review of medications and discharge plan. Signed:    Candace Miranda   9/11/2018      Thank you Lizeth Bragg MD for the opportunity to be involved in this patient's care.  If you have any questions or concerns please feel free to contact me at [phone]

## 2018-09-11 NOTE — DISCHARGE SUMMARY
Patient discharged home with wife. Both IV's removed. Discharge instructions reviewed thoroughly with patient and wife who both verbalized understanding. Patient had no further questions. Scripts given to patient. Patient idenpenedent and ambulatory upon discharge.

## 2018-09-11 NOTE — CONSULTS
[facility]    Consult Note      INTERVAL HISTORY OF PRESENT ILLNESS:      Mr. Lorraine Kapoor  is a 63 y.o. male we are seeing in consultation for Stage IV pancreatic adenocarcinoma. Pt had CT-guided needle liver biopsy with IR yesterday. Surgical pathology is pending. He was started on Xarelto DVT/PE dose yesterday after the biopsy. Dr. Ramiro Bravo also spoke with pt and his wife yesterday about treatment options going forward. Pt and wife very anxious about the news of the recurrence. He has no complaints this am. He is feeling well. We answered questions about anticoagulation. His biopsy site is c/d/i. He tolerated breakfast well. No complaints of dyspnea.        Past Medical History:        Diagnosis Date    Anxiety     Arthritis     back and toe    Depression     GERD (gastroesophageal reflux disease)     Hyperlipidemia     Hypertension     Liver metastases ([facility] [address] 9/10/2018    Pancreatic cancer ([facility] [address] 7/17/2017     Past Surgical History:        Procedure Laterality Date    ABDOMINAL EXPLORATION SURGERY  11/01/2017    EXPLORATORY LAPAROTOMY, PANCREATICODUODENECTOMY, LIVER LESION RESECTION, PARTIAL PORTAL VEIN DISSECTION     ACHILLES TENDON SURGERY Right 2007 (approximate)    APPENDECTOMY  7 29 11    CHOLECYSTECTOMY, LAPAROSCOPIC  7 29 11    COLONOSCOPY      ENDOSCOPY, COLON, DIAGNOSTIC      ERCP      LAPAROSCOPY  07/19/2017    diagnostic laparoscopy    LEG TENDON SURGERY      right leg    UPPER GASTROINTESTINAL ENDOSCOPY N/A 06/30/2017    with biiopsy       Current Medications:    Current Facility-Administered Medications: rivaroxaban (XARELTO) tablet 15 mg, 15 mg, Oral, BID WC  iohexol (OMNIPAQUE 240) injection 50 mL, 50 mL, Oral, ONCE PRN  magic (miracle) mouthwash with nystatin, 10 mL, Swish & Swallow, PRN  metoclopramide (REGLAN) injection 10 mg, 10 mg, Intravenous, Q6H  atorvastatin (LIPITOR) tablet 40 mg, 40 mg, Oral, Daily  vitamin D (CHOLECALCIFEROL) tablet 1,000 Units, 1,000 Units, Oral, Nightly  docusate sodium (COLACE) capsule 100 mg, 100 mg, Oral, BID PRN  doxazosin (CARDURA) tablet 8 mg, 8 mg, Oral, Daily  megestrol (MEGACE) 40 MG/ML suspension 400 mg, 400 mg, Oral, BID  pantoprazole (PROTONIX) tablet 40 mg, 40 mg, Oral, QAM AC  venlafaxine (EFFEXOR XR) extended release capsule 150 mg, 150 mg, Oral, Daily  zolpidem (AMBIEN) tablet 5 mg, 5 mg, Oral, Nightly PRN  sodium chloride flush 0.9 % injection 10 mL, 10 mL, Intravenous, 2 times per day  sodium chloride flush 0.9 % injection 10 mL, 10 mL, Intravenous, PRN  magnesium hydroxide (MILK OF MAGNESIA) 400 MG/5ML suspension 30 mL, 30 mL, Oral, Daily PRN  ondansetron (ZOFRAN) injection 4 mg, 4 mg, Intravenous, Q6H PRN  hydrochlorothiazide (HYDRODIURIL) tablet 25 mg, 25 mg, Oral, Daily  famotidine (PEPCID) injection 20 mg, 20 mg, Intravenous, Daily  Allergies:  Patient has no known allergies. Social History:      Social History     Social History    Marital status:      Spouse name: N/A    Number of children: N/A    Years of education: N/A     Occupational History    Not on file.      Social History Main Topics    Smoking status: Former Smoker     Packs/day: 0.50     Years: 15.00     Types: Cigars, Pipe    Smokeless tobacco: Never Used      Comment: quit pipe smoking     Alcohol use No      Comment: 3 drinks/day until 2017 then quit    Drug use: No    Sexual activity: Not on file     Other Topics Concern    Not on file     Social History Narrative    No narrative on file          Family History:     Family History   Problem Relation Age of Onset    Cancer Mother         breast    Asthma Mother     Other Father         perforating gastric ulcer-  age 68    Heart Disease Father     Depression Brother     Substance Abuse Brother     Diabetes Maternal Grandmother     Cancer Maternal Grandfather         colon- dx'd late 68's    Other Brother         atherosclerosis    Cancer Paternal Grandfather      REVIEW OF SYSTEMS:    ROS per the HPI   Otherwise  10 point ROS negative     PHYSICAL EXAM:      Constitutional: in no acute distress  Head: atraumatic, normocephalic  Neck: supple, no adenopathy  Lungs: CTAB  CV: RRR, normal S1/S2, no murmurs  Abdomen: liver biopsy site c/d/i, non-tender, non-distended  Skin: no bleeding or bruising    Vitals:  /83   Pulse 88   Temp 98.7 °F (37.1 °C) (Oral)   Resp 17   Ht 5' 10.87\" (1.8 m)   Wt 115 lb (52.2 kg)   SpO2 97%   BMI 16.10 kg/m²       DATA:    PT/INR:  No results for input(s): PROT, INR in the last 72 hours. PTT:  No results for input(s): APTT in the last 72 hours. CMP:    Lab Results   Component Value Date     09/07/2018    K 3.1 09/07/2018     09/07/2018    CO2 23 09/07/2018    BUN 10 09/07/2018    PROT 6.9 11/12/2017    PROT 7.5 08/24/2017     Magnesium:    Lab Results   Component Value Date    MG 2.20 09/07/2018     Phosphorus:  No components found for: PO4  Calcium:  No components found for: CA  CBC:    Lab Results   Component Value Date    WBC 4.7 09/09/2018    RBC 2.51 09/09/2018    RBC 3.44 08/24/2017    HGB 9.5 09/09/2018    HCT 27.8 09/09/2018    .5 09/09/2018    RDW 13.8 09/09/2018     09/09/2018     DIFF:    Lab Results   Component Value Date    .5 09/09/2018    RDW 13.8 09/09/2018      LDH:  @labcrnt(LDH)@  Uric Acid:  @labcrnt(URIC)@    Radiology Review: Xr Chest Standard (2 Vw)    Result Date: 9/6/2018  PA and lateral chest HISTORY: Dyspnea COMPARISON: Chest x-ray from 7/19/2017 FINDINGS: Heart size is normal with normal pulmonary vascularity. The thoracic aorta is mildly tortuous. There is minimal airspace disease within right lower lobe, in region of right costophrenic angle. Left lung is clear. There is no pleural fluid. There are old, healed rib fractures. CT PowerPort remains in place via right subclavian vein.      Minimal airspace disease within the right lower lobe (atelectasis versus Pancreas: There is ill-defined soft tissue attenuation near the Whipple surgical bed and most conspicuous on diffusion-weighted images measuring approximately 1.8 x 2.9 cm. This most likely corresponds to soft tissue encasing the hepatic artery on recent  CT. Spleen: Normal. Adrenals: Normal. Kidneys: Symmetric enhancement. No hydronephrosis or mass. Other: Heterogeneously enhancing peritoneal tumor deposit in the right upper quadrant anterior to the right kidney measuring 1.7 x 2.0 cm (image 40). 2.3 x 2.0 necrotic SMA axis lymph node (image 47). There are scattered T2 hyperintense lesions in T10, L1 and L2 vertebral bodies, measuring up to 1 cm. The L1 lesion is enhancing. Patchy right lower lobe opacities, as on recent CT. 1.  Irregular soft tissue mass near the Whipple resection bed likely corresponds to recurrent tumor encasing the hepatic artery on recent CT. 2.  Multiple hepatic lesions, consistent with metastases. 3.  Metastatic SMA axis lymphadenopathy. 4.  Metastatic peritoneal nodule in the right upper quadrant. 5.  Scattered vertebral body lesions, suspicious for metastases. Ct Abdomen Pelvis W Iv Contrast Additional Contrast? Oral    Result Date: 9/8/2018  EXAM: CT ABDOMEN AND PELVIS WITH CONTRAST INDICATION: liver mets , anc cancer, Pain COMPARISON: CT abdomen pelvis dated November 14, 2017. CTA chest dated September 6, 2018. TECHNIQUE: Axial CT imaging obtained from lung bases through pelvis. Axial images and multiplanar reformatted images are provided for review. Up-to-date CT equipment and radiation dose reduction techniques were employed. IV Contrast: 100 cc Omnipaque 350 Oral Contrast: Yes. FINDINGS: There are small subpleural nodular opacities identified in the right lower lobe which likely reflect pulmonary infarcts, given recent diagnosis of multiple pulmonary emboli. The patient is status post Whipple procedure. Pneumobilia is evident.  There are new lesions within the liver which are ill-defined and hypodense. For example in the right hepatic lobe there is a lesion measuring 14 x 12 mm (image 41, series 2). An additional lesion in the left hepatic lobe measuring 10 x 7 mm (image 26, series 2). There is soft tissue encasement of the main hepatic artery consistent with tumor. This is best seen on image 50 of series 2). There is symmetric renal enhancement. The spleen and adrenal glands and residual pancreas are unremarkable. There is a low-attenuation lymph node adjacent to the superior mesenteric artery which measures 22 x 19 mm (image 58, series 2). There is mild thickening of the cecum with adjacent fat stranding. There is no evidence of free intraperitoneal air. 1. Small new liver lesions consistent with hepatic metastatic disease. 2. Tumor encasement of the main hepatic artery. 3. Necrotic retroperitoneal lymph node consistent with metastatic disease. 4. Subpleural pulmonary infarcts are identified in the right lower lobe, in the setting of recently diagnosed pulmonary emboli. Ct Needle Biopsy Liver Percutaneous    Addendum Date: 9/10/2018    Addendum: By Dr. Tom Guzman [facility] Under sterile technique and following 2% lidocaine local anesthetic was administered along the tract planned for liver biopsy. Result Date: 9/10/2018  CT-GUIDED PERCUTANEOUS LIVER BIOPSY Conscious sedation INDICATIONS: Pancreatic cancer, new small liver lesions, possible metastasis Procedure performed by Dr. Estelita Garcia Risks and benefits discussed and informed consent obtained. CONSCIOUS SEDATION: Intravenous Versed and fentanyl with hemodynamic monitoring and pulse oximetry. Independent nursing observer: Incremental administration  of Versed and fentanyl Time started: 1455 Time completed: 1510 Post sedation exam: No complications, stable vital signs PROCEDURE: The patient was placed supine., Right lateral intercostal approach was utilized.  Axial computed tomography was performed with identification of the liver lesions, more difficult to identify noncontrast when compared to the previous contrast enhanced scan. The more peripheral lesion in the right lobewas chosen STERILE PREP: Full maximum sterile field/barrier technique was followed (with cap and mask, gloves and sterile gown, as well as broad field sterile drapes). Local disinfection was performed with broad field application of orange dyed chloraprep solution, which was allowed to dry fully prior to the initiation of the procedure. Maintain sterile field at all times. Medications labeled Under sterile technique and following 2% lidocaine local anesthetic the tract planned for renal biopsy was anesthetized. Small skin incision was made. 17-gauge guiding needle was introduced to the periphery of the right lobe of the liver. Satisfactory core biopsy was obtained, satisfactory specimen was submitted Complications: None Estimated blood loss: Less than 0.5cc     1. Satisfactory uneventful CT-guided percutaneous hepatic core biopsy, right lobe. 2. Conscious sedation without complication    Cta Chest W Contrast    Result Date: 9/6/2018  CT angiography of the chest (with IV contrast) HISTORY: Dyspnea, prior Whipple procedure for pancreatic carcinoma TECHNICAL FACTORS: Spiral CT imaging performed with 80 mL of Isovue-370. Maximum intensity projection reformatting performed. CT dose reduction techniques were utilized. COMPARISON: CT of chest, abdomen, pelvis from 5/3/2018 FINDINGS: There are pulmonary emboli involving the right upper and right lower lobe pulmonary artery branches. There are pulmonary emboli within the interlobar portion of right pulmonary artery. No left pulmonary emboli are identified. The main pulmonary  arteries are not dilated. Heart size is normal. There are coronary artery calcifications. There is no evidence of right heart strain. There is no pericardial effusion. There is mild atelectasis within the periphery of the right lower lobe.  There is a 0.5 cm subpleural nodule within the left lower lobe (axial image 142), increased in size. There is no pleural fluid. There is no mediastinal, hilar, or axillary lymphadenopathy. Limited images through the upper abdomen demonstrate changes from prior pancreatectomy. There is a 3.0 x 3.0 cm mass encasing the hepatic artery, slightly increased from 5/3/2018. There is gas within the biliary tract secondary to the prior surgery. Within the medial segment of left lobe of liver, near the hepatorenal fossa, there is a 2.2 cm elliptical low-attenuation lesion, increased in size. There is a small amount of perihepatic ascites. There is a small amount of ascites surrounding the spleen. There is no bony destructive process. 1. Moderate right pulmonary emboli. Results were called to the emergency room at time of dictation. 2. 0.5 cm subpleural nodule within the left lower lobe, increased in size, consistent with a pulmonary metastasis. 3. Prior Whipple procedure. 3.0 cm mass encasing the hepatic artery, slightly increased in size from 5/3/2018. 4. 2.2 cm low-attenuation lesion within medial segment of left lobe of liver, most likely representing a hepatic metastasis. 5. Small amount of perihepatic and perisplenic ascites.     Vl Extremity Venous Bilateral    Result Date: 9/7/2018  Lower Extremities DVT Study  Demographics   Patient Name      MARU Bruner   Date of Study     09/07/2018          Gender              Male   Patient Number    [id number]          Date of Birth       1955   Visit Number      [id number]           Age                 63 year(s)   Accession Number  [id number]           Room Number         1596   Corporate ID      [id number]            Sonographer         Helen Guillaume RVT, [id number]   Ordering          Garey Apley [address] [facility]  Physician         Beena Díaz MD      Physician           Readers Angel Trejo MD  Procedure Type of Study:   Veins:Lower Extremities DVT Study, VASC EXTREMITY VENOUS DUPLEX BILATERAL. Vascular Sonographer Report  Indications for Study:Suspected pulmonary embolism. Additional Indications:Patient arrived in emergency room with shortness of breath. CTA Chest with contrast was positive for pulmonary embolism. Patient has a history of pancreatic cancer. Venous Duplex Scan: B-mode imaging of the deep and superficial veins, with compression maneuvers, including color and Doppler spectral waveform analysis. Impressions Right Impression Limited visualization of calf veins due to body habitus and patient positioning. There is acute deep venous thrombosis involving the posterior tibial and peroneal veins. The greater saphenous vein has a thickened wall. Within the limits of this exam, there is no other evidence of deep or superficial venous thrombosis of the lower right extremity. Left Impression Limited visualization of the calf veins due to body habitus and patient positioning. There is acute deep venous thrombosis of the posterior tibial and peroneal veins. There is chronic superficial venous thrombosis of the proximal greater saphenous vein at the junction of the common femoral vein. Within the limits of this exam, there is no other evidence of deep or superficial venous thrombosis of the lower left extremity. There are no prior exams for comparison. Conclusions   Summary   Limited visualization of calf veins due to body habitus and patient  positioning. There is acute deep venous thrombosis involving the bilateral posterior  tibial and peroneal veins. There is chronic superficial venous thrombosis of the left proximal greater  saphenous vein at the junction of the common femoral vein.   Within the limits of this exam, there is no other evidence of deep or  superficial !Yes       !Yes            ! None      ! +------------------------+----------+---------------+----------+ ! Popliteal               !Yes       ! Yes            ! None      ! +------------------------+----------+---------------+----------+ ! GSV Below Knee          ! Yes       ! Yes            ! None      ! +------------------------+----------+---------------+----------+ ! Gastroc                 ! Yes       ! Yes            ! None      ! +------------------------+----------+---------------+----------+ ! Soleal                  !No        !               !          ! +------------------------+----------+---------------+----------+ ! PTV                     ! Yes       ! No             !Acute     ! +------------------------+----------+---------------+----------+ ! Peroneal                !Yes       ! No             !Acute     ! +------------------------+----------+---------------+----------+ ! GSV Calf                ! Yes       ! Yes            ! None      ! +------------------------+----------+---------------+----------+ ! SSV                     ! No        !               !          ! +------------------------+----------+---------------+----------+ Right Doppler Measurements +------------------------+----------+------+------------+ ! Location                ! Signal    !Reflux! Reflux (sec)! +------------------------+----------+------+------------+ ! Sapheno Femoral Junction! Phasic    !      !            ! +------------------------+----------+------+------------+ ! Common Femoral          !Phasic    !      !            ! +------------------------+----------+------+------------+ ! Prox Femoral            !Continuous!      !            ! +------------------------+----------+------+------------+ ! Deep Femoral            !Continuous!      !            ! +------------------------+----------+------+------------+ ! Popliteal               !Phasic    !      !            ! +------------------------+----------+------+------------+ Left Lower Extremities DVT Study Measurements Left 2D Measurements +------------------------+----------+---------------+----------+ ! Location                ! Visualized! Compressibility! Thrombosis! +------------------------+----------+---------------+----------+ ! Sapheno Femoral Junction! Yes       ! Yes            ! None      ! +------------------------+----------+---------------+----------+ ! GSV Thigh               ! Yes       ! Partial        !Chronic   ! +------------------------+----------+---------------+----------+ ! Common Femoral          !Yes       ! Yes            ! None      ! +------------------------+----------+---------------+----------+ ! Femoral                 !Yes       ! Yes            ! None      ! +------------------------+----------+---------------+----------+ ! Prox Femoral            !Yes       ! Yes            ! None      ! +------------------------+----------+---------------+----------+ ! Mid Femoral             !Yes       ! Yes            ! None      ! +------------------------+----------+---------------+----------+ ! Dist Femoral            !Yes       ! Yes            ! None      ! +------------------------+----------+---------------+----------+ ! Deep Femoral            !Yes       ! Yes            ! None      ! +------------------------+----------+---------------+----------+ ! Popliteal               !Yes       ! Yes            ! None      ! +------------------------+----------+---------------+----------+ ! GSV Below Knee          ! Yes       ! Yes            ! None      ! +------------------------+----------+---------------+----------+ ! Gastroc                 ! Yes       ! Yes            ! None      ! +------------------------+----------+---------------+----------+ ! Soleal                  !No        !               !          ! +------------------------+----------+---------------+----------+ ! PTV                     ! Yes       ! Partial        !Acute     ! +------------------------+----------+---------------+----------+

## 2018-09-12 ENCOUNTER — CARE COORDINATION (OUTPATIENT)
Dept: CASE MANAGEMENT | Age: 63
End: 2018-09-12

## 2018-09-12 DIAGNOSIS — E44.0 MODERATE MALNUTRITION (HCC): Primary | Chronic | ICD-10-CM

## 2018-09-12 NOTE — CARE COORDINATION
office twice and is waiting for a call back to schedule appt. She states that she does not think PCP appt is necessary at this point. Wife denies any additional needs and is agreeable with additional calls.     Follow Up  Future Appointments  Date Time Provider Department Center   9/18/2018 11:45 AM MD [facility]   [facility] 10[facility]       Virgen Rogel RN

## 2018-09-18 LAB
EKG ATRIAL RATE: 93 BPM
EKG DIAGNOSIS: NORMAL
EKG P AXIS: 65 DEGREES
EKG P-R INTERVAL: 124 MS
EKG Q-T INTERVAL: 334 MS
EKG QRS DURATION: 88 MS
EKG QTC CALCULATION (BAZETT): 415 MS
EKG R AXIS: 11 DEGREES
EKG T AXIS: 60 DEGREES
EKG VENTRICULAR RATE: 93 BPM

## 2018-09-20 ENCOUNTER — CARE COORDINATION (OUTPATIENT)
Dept: CASE MANAGEMENT | Age: 63
End: 2018-09-20

## 2018-09-27 ENCOUNTER — HOSPITAL ENCOUNTER (OUTPATIENT)
Dept: CT IMAGING | Age: 63
Discharge: HOME | End: 2018-09-27
Payer: COMMERCIAL

## 2018-09-27 VITALS
TEMPERATURE: 97.8 F | HEIGHT: 70 IN | OXYGEN SATURATION: 98 % | HEART RATE: 62 BPM | WEIGHT: 130 LBS | SYSTOLIC BLOOD PRESSURE: 135 MMHG | BODY MASS INDEX: 18.61 KG/M2 | DIASTOLIC BLOOD PRESSURE: 87 MMHG | RESPIRATION RATE: 16 BRPM

## 2018-09-27 DIAGNOSIS — K76.9 LESION OF LIVER: ICD-10-CM

## 2018-09-27 PROCEDURE — 88341 IMHCHEM/IMCYTCHM EA ADD ANTB: CPT

## 2018-09-27 PROCEDURE — 88342 IMHCHEM/IMCYTCHM 1ST ANTB: CPT

## 2018-09-27 PROCEDURE — 2709999900 CT NEEDLE BIOPSY LIVER PERCUTANEOUS

## 2018-09-27 PROCEDURE — 7100000011 HC PHASE II RECOVERY - ADDTL 15 MIN

## 2018-09-27 PROCEDURE — 88307 TISSUE EXAM BY PATHOLOGIST: CPT

## 2018-09-27 PROCEDURE — 7100000010 HC PHASE II RECOVERY - FIRST 15 MIN

## 2018-09-27 RX ORDER — FENTANYL CITRATE 50 UG/ML
50 INJECTION, SOLUTION INTRAMUSCULAR; INTRAVENOUS ONCE
Status: DISCONTINUED | OUTPATIENT
Start: 2018-09-27 | End: 2018-09-28 | Stop reason: HOSPADM

## 2018-09-27 RX ORDER — MIDAZOLAM HYDROCHLORIDE 1 MG/ML
3 INJECTION INTRAMUSCULAR; INTRAVENOUS ONCE
Status: DISCONTINUED | OUTPATIENT
Start: 2018-09-27 | End: 2018-09-28 | Stop reason: HOSPADM

## 2018-09-27 RX ORDER — OXYCODONE HYDROCHLORIDE AND ACETAMINOPHEN 5; 325 MG/1; MG/1
1 TABLET ORAL EVERY 4 HOURS PRN
Status: DISCONTINUED | OUTPATIENT
Start: 2018-09-27 | End: 2018-09-28 | Stop reason: HOSPADM

## 2018-09-27 RX ORDER — HEPARIN SODIUM (PORCINE) LOCK FLUSH IV SOLN 100 UNIT/ML 100 UNIT/ML
500 SOLUTION INTRAVENOUS PRN
Status: DISCONTINUED | OUTPATIENT
Start: 2018-09-27 | End: 2018-09-28 | Stop reason: HOSPADM

## 2018-09-27 RX ORDER — ACETAMINOPHEN 325 MG/1
650 TABLET ORAL EVERY 4 HOURS PRN
Status: DISCONTINUED | OUTPATIENT
Start: 2018-09-27 | End: 2018-09-28 | Stop reason: HOSPADM

## 2018-09-27 RX ORDER — OXYCODONE HYDROCHLORIDE AND ACETAMINOPHEN 5; 325 MG/1; MG/1
2 TABLET ORAL EVERY 4 HOURS PRN
Status: DISCONTINUED | OUTPATIENT
Start: 2018-09-27 | End: 2018-09-28 | Stop reason: HOSPADM

## 2018-09-27 ASSESSMENT — PAIN SCALES - GENERAL: PAINLEVEL_OUTOF10: 0

## 2018-09-27 NOTE — H&P
Brief Postoperative Note    Dileep Hale  YOB: 1955  [id number]    Pre-operative Diagnosis: Panc CA for liver Bx  repeat    Post-operative Diagnosis: Same    Procedure: Core biopsy liver    Anesthesia: conscious    Surgeons/Assistants: Isabelle Massey    Estimated Blood Loss: Minimal    Complications: none    Specimens: were obtained  Several cores submitted      Adebayo Rosen MD  9/27/2018

## 2018-09-27 NOTE — PROGRESS NOTES
Patient to [facility] post liver b. Color good skin w/d resp. Full and easy. Bx. Site dressing dry no bleeding or bruising at site and patient denies pain. Wife with patient and pt. Taking fluids and pudding. Patient recovery uneventful. Biopsy site negative with no bleeding and no swelling near area , no SOB with SpO2 at 98% off of oxygen at time of discharge. Patient up to BR x 1 and had BM. Wife has called  and instructed when to resume his anticoagulant. This was communicated to her per office. Patient up and dressed and stable. Port de-accessed  And flushed with 100 units of heparin as directed and bandaide placed over site after area cleansed with antiseptic. Wife voices understanding of discharge instructions and patient discharged stable per w/c.

## 2018-09-27 NOTE — H&P
IR  H & P      Patient:  Amanda Fuentes   :   1955      Relevant patient history reviewed and discussed. CC:  Pancreatic CA  For  REPEAT liver biopsy with sedation    Very small central and peripheral lesion  CT and MRI    The procedure including risks and benefits was discussed at length with the patient (or designated family member) and all questions were answered. Informed consent to proceed with the procedure was given. Heart : regular rate and rhythm  Lungs : clear, breathing easily  Airway Assessment: normal  Condition : stable    [facility] nurses notes reviewed and agreed. Medications reviewed.   Allergies: No Known Allergies  Sedation : Moderate sedation planned  ASA 1 - Normal health patient     cleared

## 2018-09-28 ENCOUNTER — CARE COORDINATION (OUTPATIENT)
Dept: CASE MANAGEMENT | Age: 63
End: 2018-09-28

## 2018-10-08 DIAGNOSIS — F51.01 PRIMARY INSOMNIA: ICD-10-CM

## 2018-10-09 RX ORDER — ZOLPIDEM TARTRATE 10 MG/1
TABLET ORAL
Qty: 90 TABLET | Refills: 0 | Status: ON HOLD | OUTPATIENT
Start: 2018-10-09 | End: 2018-12-12 | Stop reason: SDUPTHER

## 2018-10-16 ENCOUNTER — HOSPITAL ENCOUNTER (OUTPATIENT)
Dept: CT IMAGING | Age: 63
Discharge: HOME | End: 2018-10-16
Payer: COMMERCIAL

## 2018-10-16 DIAGNOSIS — C25.0 ADENOCARCINOMA OF HEAD OF PANCREAS (HCC): ICD-10-CM

## 2018-10-16 PROCEDURE — 74160 CT ABDOMEN W/CONTRAST: CPT

## 2018-10-16 PROCEDURE — 6360000002 HC RX W HCPCS: Performed by: RADIOLOGY

## 2018-10-16 PROCEDURE — 6360000004 HC RX CONTRAST MEDICATION: Performed by: NURSE PRACTITIONER

## 2018-10-16 PROCEDURE — 71260 CT THORAX DX C+: CPT

## 2018-10-16 RX ORDER — HEPARIN SODIUM (PORCINE) LOCK FLUSH IV SOLN 100 UNIT/ML 100 UNIT/ML
500 SOLUTION INTRAVENOUS PRN
Status: DISCONTINUED | OUTPATIENT
Start: 2018-10-16 | End: 2018-10-17 | Stop reason: HOSPADM

## 2018-10-16 RX ADMIN — IOPAMIDOL 80 ML: 755 INJECTION, SOLUTION INTRAVENOUS at 12:45

## 2018-10-16 RX ADMIN — Medication 500 UNITS: at 13:39

## 2018-10-16 RX ADMIN — IOHEXOL 50 ML: 240 INJECTION, SOLUTION INTRATHECAL; INTRAVASCULAR; INTRAVENOUS; ORAL at 12:46

## 2018-10-21 ENCOUNTER — HOSPITAL ENCOUNTER (EMERGENCY)
Age: 63
Discharge: HOME | End: 2018-10-22
Attending: EMERGENCY MEDICINE
Payer: COMMERCIAL

## 2018-10-21 ENCOUNTER — NURSE TRIAGE (OUTPATIENT)
Dept: OTHER | Facility: CLINIC | Age: 63
End: 2018-10-21

## 2018-10-21 DIAGNOSIS — C25.9 PANCREATIC CANCER METASTASIZED TO LIVER (HCC): ICD-10-CM

## 2018-10-21 DIAGNOSIS — R18.0 MALIGNANT ASCITES: Primary | ICD-10-CM

## 2018-10-21 DIAGNOSIS — C78.7 PANCREATIC CANCER METASTASIZED TO LIVER (HCC): ICD-10-CM

## 2018-10-21 LAB
APPEARANCE FLUID: CLEAR
CELL COUNT FLUID TYPE: NORMAL
CLOT EVALUATION: NORMAL
COLOR FLUID: NORMAL
NUCLEATED CELLS FLUID: 13 /CUMM
NUMBER OF CELLS COUNTED FLUID: 100
RBC FLUID: 121 /CUMM

## 2018-10-21 PROCEDURE — 6370000000 HC RX 637 (ALT 250 FOR IP)

## 2018-10-21 PROCEDURE — 99284 EMERGENCY DEPT VISIT MOD MDM: CPT

## 2018-10-21 PROCEDURE — 6360000002 HC RX W HCPCS

## 2018-10-21 PROCEDURE — 96374 THER/PROPH/DIAG INJ IV PUSH: CPT

## 2018-10-21 PROCEDURE — 89051 BODY FLUID CELL COUNT: CPT

## 2018-10-21 PROCEDURE — 36415 COLL VENOUS BLD VENIPUNCTURE: CPT

## 2018-10-21 PROCEDURE — 87205 SMEAR GRAM STAIN: CPT

## 2018-10-21 PROCEDURE — 87070 CULTURE OTHR SPECIMN AEROBIC: CPT

## 2018-10-21 PROCEDURE — 2500000003 HC RX 250 WO HCPCS: Performed by: STUDENT IN AN ORGANIZED HEALTH CARE EDUCATION/TRAINING PROGRAM

## 2018-10-21 PROCEDURE — 4500000024 HC ED LEVEL 4 PROCEDURE

## 2018-10-21 RX ORDER — OXYCODONE HYDROCHLORIDE AND ACETAMINOPHEN 5; 325 MG/1; MG/1
TABLET ORAL
Status: COMPLETED
Start: 2018-10-21 | End: 2018-10-21

## 2018-10-21 RX ORDER — HEPARIN SODIUM (PORCINE) LOCK FLUSH IV SOLN 100 UNIT/ML 100 UNIT/ML
100 SOLUTION INTRAVENOUS PRN
Status: DISCONTINUED | OUTPATIENT
Start: 2018-10-21 | End: 2018-10-22 | Stop reason: HOSPADM

## 2018-10-21 RX ORDER — OXYCODONE HYDROCHLORIDE AND ACETAMINOPHEN 5; 325 MG/1; MG/1
1 TABLET ORAL ONCE
Status: COMPLETED | OUTPATIENT
Start: 2018-10-21 | End: 2018-10-21

## 2018-10-21 RX ORDER — LIDOCAINE HYDROCHLORIDE AND EPINEPHRINE 10; 10 MG/ML; UG/ML
20 INJECTION, SOLUTION INFILTRATION; PERINEURAL ONCE
Status: COMPLETED | OUTPATIENT
Start: 2018-10-21 | End: 2018-10-21

## 2018-10-21 RX ADMIN — OXYCODONE HYDROCHLORIDE AND ACETAMINOPHEN 1 TABLET: 5; 325 TABLET ORAL at 21:07

## 2018-10-21 RX ADMIN — Medication 1 MG: at 21:07

## 2018-10-21 RX ADMIN — LIDOCAINE HYDROCHLORIDE,EPINEPHRINE BITARTRATE 20 ML: 10; .01 INJECTION, SOLUTION INFILTRATION; PERINEURAL at 23:30

## 2018-10-21 RX ADMIN — HYDROMORPHONE HYDROCHLORIDE 1 MG: 1 INJECTION, SOLUTION INTRAMUSCULAR; INTRAVENOUS; SUBCUTANEOUS at 21:07

## 2018-10-21 ASSESSMENT — PAIN SCALES - GENERAL
PAINLEVEL_OUTOF10: 9
PAINLEVEL_OUTOF10: 9

## 2018-10-22 VITALS
HEIGHT: 70 IN | TEMPERATURE: 98.2 F | HEART RATE: 82 BPM | BODY MASS INDEX: 18.61 KG/M2 | DIASTOLIC BLOOD PRESSURE: 84 MMHG | RESPIRATION RATE: 14 BRPM | WEIGHT: 130 LBS | SYSTOLIC BLOOD PRESSURE: 125 MMHG | OXYGEN SATURATION: 100 %

## 2018-10-22 PROCEDURE — 96376 TX/PRO/DX INJ SAME DRUG ADON: CPT

## 2018-10-22 PROCEDURE — 6360000002 HC RX W HCPCS: Performed by: STUDENT IN AN ORGANIZED HEALTH CARE EDUCATION/TRAINING PROGRAM

## 2018-10-22 RX ORDER — OXYCODONE HYDROCHLORIDE 5 MG/1
5 TABLET ORAL EVERY 6 HOURS PRN
Qty: 15 TABLET | Refills: 0 | Status: SHIPPED | OUTPATIENT
Start: 2018-10-22 | End: 2018-10-29

## 2018-10-22 RX ADMIN — HYDROMORPHONE HYDROCHLORIDE 1 MG: 1 INJECTION, SOLUTION INTRAMUSCULAR; INTRAVENOUS; SUBCUTANEOUS at 00:34

## 2018-10-22 ASSESSMENT — PAIN SCALES - GENERAL: PAINLEVEL_OUTOF10: 5

## 2018-10-22 NOTE — ED PROVIDER NOTES
ED Attending Attestation Note     Date of evaluation: 10/21/2018    This patient was seen by the resident. I have seen and examined the patient, agree with the workup, evaluation, management and diagnosis. The care plan has been discussed. My assessment reveals With benign but distended abdomen. Present for paracentesis.      Justin Strickland MD  10/21/18 4892
RADIOLOGY:  No orders to display       LABS:   Results for orders placed or performed during the hospital encounter of 10/21/18   Body Fluid Culture   Result Value Ref Range    Gram Stain Result 1+ WBC's (Polymorphonuclear)  No organisms seen      Body Fluid Cell Count with Differential   Result Value Ref Range    Cell Count Fluid Type Peritoneal Fl. Color, Fluid Pale Yellow     Appearance, Fluid Clear     Clot Eval. see below     Nucl Cell, Fluid 13 /cumm    RBC, Fluid 121 /cumm    Number of Cells Counted Fluid 100        ED BEDSIDE ULTRASOUND:  Pocket of ascitic fluid in the LLQ appears amenable to paracentesis     RECENT VITALS:  BP: 125/84, Temp: 98.2 °F (36.8 °C), Pulse: 82,Resp: 14, SpO2: 100 %     Procedures     Paracentesis  Date/Time: 10/22/2018 1:34 AM  Performed by: Eitan Vinson by: Jana Borges     Consent:     Consent obtained:  Verbal    Consent given by:  Patient    Risks discussed:  Infection and pain    Alternatives discussed:  No treatment and delayed treatment  Pre-procedure details:     Procedure purpose:  Therapeutic  Anesthesia (see MAR for exact dosages): Anesthesia method:  Local infiltration    Local anesthetic:  Lidocaine 1% WITH epi  Procedure details:     Needle gauge: 25.    Ultrasound guidance: yes      Puncture site:  L lower quadrant    Fluid appearance:  Bloody and clear (1st syringe was blood tinged, others were clear)    Dressing:  4x4 sterile gauze and adhesive bandage  Post-procedure details:     Patient tolerance of procedure: Tolerated well, no immediate complications  Comments:      Drained 500 mL of ascitic fluid, after which patient reported improvement in symptoms. ED Course     Nursing Notes, Past Medical Hx, Past Surgical Hx, Social Hx, Allergies, and Family Hx were reviewed.     The patient was given the followingmedications:  Orders Placed This Encounter   Medications    oxyCODONE-acetaminophen (PERCOCET) 5-325 MG

## 2018-10-24 LAB
BODY FLUID CULTURE, STERILE: NORMAL
GRAM STAIN RESULT: NORMAL

## 2018-10-26 ENCOUNTER — NURSE TRIAGE (OUTPATIENT)
Dept: OTHER | Facility: CLINIC | Age: 63
End: 2018-10-26

## 2018-11-01 PROBLEM — C25.0 MALIGNANT NEOPLASM OF HEAD OF PANCREAS (HCC): Status: ACTIVE | Noted: 2017-07-17

## 2018-11-19 RX ORDER — ATORVASTATIN CALCIUM 40 MG/1
TABLET, FILM COATED ORAL
Qty: 90 TABLET | Refills: 0 | Status: SHIPPED | OUTPATIENT
Start: 2018-11-19

## 2018-12-04 ENCOUNTER — HOSPITAL ENCOUNTER (OUTPATIENT)
Dept: ULTRASOUND IMAGING | Age: 63
Discharge: HOME | End: 2018-12-04
Payer: COMMERCIAL

## 2018-12-04 DIAGNOSIS — C25.0 MALIGNANT TUMOR OF HEAD OF PANCREAS (HCC): ICD-10-CM

## 2018-12-04 PROCEDURE — 49083 ABD PARACENTESIS W/IMAGING: CPT

## 2018-12-10 ENCOUNTER — NURSE TRIAGE (OUTPATIENT)
Dept: OTHER | Facility: CLINIC | Age: 63
End: 2018-12-10

## 2018-12-11 ENCOUNTER — APPOINTMENT (OUTPATIENT)
Dept: ULTRASOUND IMAGING | Age: 63
DRG: 551 | End: 2018-12-11
Payer: COMMERCIAL

## 2018-12-11 ENCOUNTER — HOSPITAL ENCOUNTER (INPATIENT)
Age: 63
LOS: 2 days | Discharge: HOSPICE | DRG: 551 | End: 2018-12-13
Attending: EMERGENCY MEDICINE | Admitting: INTERNAL MEDICINE
Payer: COMMERCIAL

## 2018-12-11 ENCOUNTER — APPOINTMENT (OUTPATIENT)
Dept: GENERAL RADIOLOGY | Age: 63
DRG: 551 | End: 2018-12-11
Payer: COMMERCIAL

## 2018-12-11 DIAGNOSIS — M54.6 PAIN IN THORACIC SPINE: ICD-10-CM

## 2018-12-11 DIAGNOSIS — C78.7 PANCREATIC CANCER METASTASIZED TO LIVER (HCC): ICD-10-CM

## 2018-12-11 DIAGNOSIS — C25.9 PANCREATIC CANCER METASTASIZED TO LIVER (HCC): ICD-10-CM

## 2018-12-11 DIAGNOSIS — C25.0 MALIGNANT NEOPLASM OF HEAD OF PANCREAS (HCC): ICD-10-CM

## 2018-12-11 DIAGNOSIS — R07.81 RIB PAIN ON LEFT SIDE: ICD-10-CM
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
## 2018-12-11 PROBLEM — E43 SEVERE MALNUTRITION (HCC): Status: ACTIVE | Noted: 2018-12-11

## 2018-12-11 PROBLEM — M54.9 BACK PAIN: Status: ACTIVE | Noted: 2018-12-11

## 2018-12-11 PROBLEM — E43 SEVERE MALNUTRITION (HCC): Chronic | Status: ACTIVE | Noted: 2018-12-11

## 2018-12-11 LAB
ALBUMIN SERPL-MCNC: 1.5 G/DL (ref 3.4–5)
ALP BLD-CCNC: 263 U/L (ref 40–129)
ALT SERPL-CCNC: 28 U/L (ref 10–40)
ANION GAP SERPL CALCULATED.3IONS-SCNC: 15 MMOL/L (ref 3–16)
ANION GAP SERPL CALCULATED.3IONS-SCNC: 18 MMOL/L (ref 3–16)
AST SERPL-CCNC: 28 U/L (ref 15–37)
BASOPHILS ABSOLUTE: 0 K/UL (ref 0–0.2)
BASOPHILS ABSOLUTE: 0 K/UL (ref 0–0.2)
BASOPHILS RELATIVE PERCENT: 0 %
BASOPHILS RELATIVE PERCENT: 0.2 %
BILIRUB SERPL-MCNC: 1.1 MG/DL (ref 0–1)
BILIRUBIN DIRECT: 0.7 MG/DL (ref 0–0.3)
BILIRUBIN, INDIRECT: 0.4 MG/DL (ref 0–1)
BUN BLDV-MCNC: 17 MG/DL (ref 7–20)
BUN BLDV-MCNC: 17 MG/DL (ref 7–20)
CALCIUM SERPL-MCNC: 6.8 MG/DL (ref 8.3–10.6)
CALCIUM SERPL-MCNC: 7.1 MG/DL (ref 8.3–10.6)
CHLORIDE BLD-SCNC: 98 MMOL/L (ref 99–110)
CHLORIDE BLD-SCNC: 98 MMOL/L (ref 99–110)
CO2: 14 MMOL/L (ref 21–32)
CO2: 16 MMOL/L (ref 21–32)
CREAT SERPL-MCNC: 1.3 MG/DL (ref 0.8–1.3)
CREAT SERPL-MCNC: 1.4 MG/DL (ref 0.8–1.3)
EOSINOPHILS ABSOLUTE: 0 K/UL (ref 0–0.6)
EOSINOPHILS ABSOLUTE: 0 K/UL (ref 0–0.6)
EOSINOPHILS RELATIVE PERCENT: 0.1 %
EOSINOPHILS RELATIVE PERCENT: 0.2 %
GFR AFRICAN AMERICAN: >60
GFR AFRICAN AMERICAN: >60
GFR NON-AFRICAN AMERICAN: 51
GFR NON-AFRICAN AMERICAN: 56
GLUCOSE BLD-MCNC: 111 MG/DL (ref 70–99)
GLUCOSE BLD-MCNC: 139 MG/DL (ref 70–99)
HCT VFR BLD CALC: 22.3 % (ref 40.5–52.5)
HCT VFR BLD CALC: 23.8 % (ref 40.5–52.5)
HEMOGLOBIN: 7.4 G/DL (ref 13.5–17.5)
HEMOGLOBIN: 7.9 G/DL (ref 13.5–17.5)
INR BLD: 2.89 (ref 0.86–1.14)
LACTIC ACID: 4.5 MMOL/L (ref 0.4–2)
LYMPHOCYTES ABSOLUTE: 0.3 K/UL (ref 1–5.1)
LYMPHOCYTES ABSOLUTE: 0.3 K/UL (ref 1–5.1)
LYMPHOCYTES RELATIVE PERCENT: 3.9 %
LYMPHOCYTES RELATIVE PERCENT: 4.3 %
MCH RBC QN AUTO: 35.6 PG (ref 26–34)
MCH RBC QN AUTO: 35.8 PG (ref 26–34)
MCHC RBC AUTO-ENTMCNC: 33.1 G/DL (ref 31–36)
MCHC RBC AUTO-ENTMCNC: 33.1 G/DL (ref 31–36)
MCV RBC AUTO: 107.6 FL (ref 80–100)
MCV RBC AUTO: 108.1 FL (ref 80–100)
MONOCYTES ABSOLUTE: 0.5 K/UL (ref 0–1.3)
MONOCYTES ABSOLUTE: 0.5 K/UL (ref 0–1.3)
MONOCYTES RELATIVE PERCENT: 6.6 %
MONOCYTES RELATIVE PERCENT: 6.9 %
NEUTROPHILS ABSOLUTE: 6.8 K/UL (ref 1.7–7.7)
NEUTROPHILS ABSOLUTE: 7.1 K/UL (ref 1.7–7.7)
NEUTROPHILS RELATIVE PERCENT: 88.7 %
NEUTROPHILS RELATIVE PERCENT: 89.1 %
PDW BLD-RTO: 14.2 % (ref 12.4–15.4)
PDW BLD-RTO: 14.5 % (ref 12.4–15.4)
PLATELET # BLD: 80 K/UL (ref 135–450)
PLATELET # BLD: 87 K/UL (ref 135–450)
PMV BLD AUTO: 8.8 FL (ref 5–10.5)
PMV BLD AUTO: 9.1 FL (ref 5–10.5)
POTASSIUM REFLEX MAGNESIUM: 3.9 MMOL/L (ref 3.5–5.1)
POTASSIUM REFLEX MAGNESIUM: 4.1 MMOL/L (ref 3.5–5.1)
PROTHROMBIN TIME: 32.9 SEC (ref 9.8–13)
RBC # BLD: 2.06 M/UL (ref 4.2–5.9)
RBC # BLD: 2.22 M/UL (ref 4.2–5.9)
SODIUM BLD-SCNC: 129 MMOL/L (ref 136–145)
SODIUM BLD-SCNC: 130 MMOL/L (ref 136–145)
TOTAL PROTEIN: 4.4 G/DL (ref 6.4–8.2)
WBC # BLD: 7.6 K/UL (ref 4–11)
WBC # BLD: 8.1 K/UL (ref 4–11)

## 2018-12-11 PROCEDURE — 71046 X-RAY EXAM CHEST 2 VIEWS: CPT

## 2018-12-11 PROCEDURE — 6360000002 HC RX W HCPCS: Performed by: STUDENT IN AN ORGANIZED HEALTH CARE EDUCATION/TRAINING PROGRAM

## 2018-12-11 PROCEDURE — 85025 COMPLETE CBC W/AUTO DIFF WBC: CPT

## 2018-12-11 PROCEDURE — 2580000003 HC RX 258: Performed by: EMERGENCY MEDICINE

## 2018-12-11 PROCEDURE — 72070 X-RAY EXAM THORAC SPINE 2VWS: CPT

## 2018-12-11 PROCEDURE — 99221 1ST HOSP IP/OBS SF/LOW 40: CPT | Performed by: NURSE PRACTITIONER

## 2018-12-11 PROCEDURE — 2580000003 HC RX 258: Performed by: STUDENT IN AN ORGANIZED HEALTH CARE EDUCATION/TRAINING PROGRAM

## 2018-12-11 PROCEDURE — 99285 EMERGENCY DEPT VISIT HI MDM: CPT

## 2018-12-11 PROCEDURE — 6370000000 HC RX 637 (ALT 250 FOR IP): Performed by: STUDENT IN AN ORGANIZED HEALTH CARE EDUCATION/TRAINING PROGRAM

## 2018-12-11 PROCEDURE — 36415 COLL VENOUS BLD VENIPUNCTURE: CPT

## 2018-12-11 PROCEDURE — 80048 BASIC METABOLIC PNL TOTAL CA: CPT

## 2018-12-11 PROCEDURE — 85610 PROTHROMBIN TIME: CPT

## 2018-12-11 PROCEDURE — 83605 ASSAY OF LACTIC ACID: CPT

## 2018-12-11 PROCEDURE — 96361 HYDRATE IV INFUSION ADD-ON: CPT

## 2018-12-11 PROCEDURE — 96365 THER/PROPH/DIAG IV INF INIT: CPT

## 2018-12-11 PROCEDURE — 2060000000 HC ICU INTERMEDIATE R&B

## 2018-12-11 PROCEDURE — 80076 HEPATIC FUNCTION PANEL: CPT

## 2018-12-11 PROCEDURE — 96375 TX/PRO/DX INJ NEW DRUG ADDON: CPT

## 2018-12-11 RX ORDER — CETIRIZINE HYDROCHLORIDE 10 MG/1
10 TABLET ORAL DAILY
COMMUNITY

## 2018-12-11 RX ORDER — CHLORAL HYDRATE 500 MG
2000 CAPSULE ORAL DAILY
COMMUNITY

## 2018-12-11 RX ORDER — DOCUSATE SODIUM 100 MG/1
100 CAPSULE, LIQUID FILLED ORAL 2 TIMES DAILY PRN
Status: DISCONTINUED | OUTPATIENT
Start: 2018-12-11 | End: 2018-12-13 | Stop reason: HOSPADM

## 2018-12-11 RX ORDER — LORAZEPAM 0.5 MG/1
0.5 TABLET ORAL 2 TIMES DAILY PRN
COMMUNITY

## 2018-12-11 RX ORDER — LIDOCAINE 4 G/G
1 PATCH TOPICAL ONCE
Status: COMPLETED | OUTPATIENT
Start: 2018-12-11 | End: 2018-12-11

## 2018-12-11 RX ORDER — 0.9 % SODIUM CHLORIDE 0.9 %
1000 INTRAVENOUS SOLUTION INTRAVENOUS ONCE
Status: COMPLETED | OUTPATIENT
Start: 2018-12-11 | End: 2018-12-11

## 2018-12-11 RX ORDER — DIPHENOXYLATE HYDROCHLORIDE AND ATROPINE SULFATE 2.5; .025 MG/1; MG/1
1 TABLET ORAL 4 TIMES DAILY PRN
COMMUNITY

## 2018-12-11 RX ORDER — FENTANYL CITRATE 50 UG/ML
25 INJECTION, SOLUTION INTRAMUSCULAR; INTRAVENOUS ONCE
Status: COMPLETED | OUTPATIENT
Start: 2018-12-11 | End: 2018-12-11

## 2018-12-11 RX ORDER — VENLAFAXINE HYDROCHLORIDE 75 MG/1
75 CAPSULE, EXTENDED RELEASE ORAL DAILY
COMMUNITY

## 2018-12-11 RX ORDER — SODIUM CHLORIDE 0.9 % (FLUSH) 0.9 %
10 SYRINGE (ML) INJECTION EVERY 12 HOURS SCHEDULED
Status: DISCONTINUED | OUTPATIENT
Start: 2018-12-11 | End: 2018-12-13 | Stop reason: HOSPADM

## 2018-12-11 RX ORDER — ATORVASTATIN CALCIUM 40 MG/1
40 TABLET, FILM COATED ORAL DAILY
Status: DISCONTINUED | OUTPATIENT
Start: 2018-12-11 | End: 2018-12-13 | Stop reason: HOSPADM

## 2018-12-11 RX ORDER — SODIUM CHLORIDE 9 MG/ML
1000 INJECTION, SOLUTION INTRAVENOUS CONTINUOUS
Status: DISCONTINUED | OUTPATIENT
Start: 2018-12-11 | End: 2018-12-13

## 2018-12-11 RX ORDER — SODIUM CHLORIDE 9 MG/ML
1000 INJECTION, SOLUTION INTRAVENOUS CONTINUOUS
Status: DISCONTINUED | OUTPATIENT
Start: 2018-12-11 | End: 2018-12-11

## 2018-12-11 RX ORDER — OXYCODONE HYDROCHLORIDE 5 MG/1
5 TABLET ORAL EVERY 4 HOURS PRN
Status: DISCONTINUED | OUTPATIENT
Start: 2018-12-11 | End: 2018-12-13 | Stop reason: HOSPADM

## 2018-12-11 RX ORDER — SODIUM CHLORIDE 0.9 % (FLUSH) 0.9 %
10 SYRINGE (ML) INJECTION PRN
Status: DISCONTINUED | OUTPATIENT
Start: 2018-12-11 | End: 2018-12-13 | Stop reason: HOSPADM

## 2018-12-11 RX ORDER — PANTOPRAZOLE SODIUM 40 MG/1
40 TABLET, DELAYED RELEASE ORAL
Status: DISCONTINUED | OUTPATIENT
Start: 2018-12-11 | End: 2018-12-13 | Stop reason: HOSPADM

## 2018-12-11 RX ORDER — METOCLOPRAMIDE 5 MG/1
5 TABLET ORAL 3 TIMES DAILY
Status: DISCONTINUED | OUTPATIENT
Start: 2018-12-11 | End: 2018-12-13 | Stop reason: HOSPADM

## 2018-12-11 RX ORDER — ONDANSETRON 2 MG/ML
4 INJECTION INTRAMUSCULAR; INTRAVENOUS EVERY 6 HOURS PRN
Status: DISCONTINUED | OUTPATIENT
Start: 2018-12-11 | End: 2018-12-13 | Stop reason: HOSPADM

## 2018-12-11 RX ORDER — ZOLPIDEM TARTRATE 5 MG/1
5 TABLET ORAL NIGHTLY PRN
Status: DISCONTINUED | OUTPATIENT
Start: 2018-12-11 | End: 2018-12-13 | Stop reason: HOSPADM

## 2018-12-11 RX ORDER — OXYCODONE HYDROCHLORIDE 5 MG/1
5-10 TABLET ORAL EVERY 4 HOURS PRN
COMMUNITY

## 2018-12-11 RX ORDER — METOCLOPRAMIDE 10 MG/1
10 TABLET ORAL DAILY PRN
COMMUNITY

## 2018-12-11 RX ORDER — MEGESTROL ACETATE 40 MG/ML
400 SUSPENSION ORAL 2 TIMES DAILY
Status: DISCONTINUED | OUTPATIENT
Start: 2018-12-11 | End: 2018-12-13 | Stop reason: HOSPADM

## 2018-12-11 RX ORDER — VENLAFAXINE HYDROCHLORIDE 75 MG/1
75 CAPSULE, EXTENDED RELEASE ORAL DAILY
Status: DISCONTINUED | OUTPATIENT
Start: 2018-12-11 | End: 2018-12-13 | Stop reason: HOSPADM

## 2018-12-11 RX ADMIN — METOCLOPRAMIDE HYDROCHLORIDE 5 MG: 5 TABLET ORAL at 09:27

## 2018-12-11 RX ADMIN — VENLAFAXINE HYDROCHLORIDE 75 MG: 75 CAPSULE, EXTENDED RELEASE ORAL at 09:27

## 2018-12-11 RX ADMIN — SODIUM CHLORIDE 1000 ML: 9 INJECTION, SOLUTION INTRAVENOUS at 01:23

## 2018-12-11 RX ADMIN — OXYCODONE HYDROCHLORIDE 5 MG: 5 TABLET ORAL at 05:48

## 2018-12-11 RX ADMIN — PANCRELIPASE 1 CAPSULE: 30000; 6000; 19000 CAPSULE, DELAYED RELEASE PELLETS ORAL at 09:27

## 2018-12-11 RX ADMIN — CALCIUM GLUCONATE 1 G: 98 INJECTION, SOLUTION INTRAVENOUS at 03:05

## 2018-12-11 RX ADMIN — ONDANSETRON 4 MG: 2 INJECTION INTRAMUSCULAR; INTRAVENOUS at 09:31

## 2018-12-11 RX ADMIN — ATORVASTATIN CALCIUM 40 MG: 40 TABLET, FILM COATED ORAL at 09:27

## 2018-12-11 RX ADMIN — OXYCODONE HYDROCHLORIDE 5 MG: 5 TABLET ORAL at 17:16

## 2018-12-11 RX ADMIN — FENTANYL CITRATE 25 MCG: 50 INJECTION, SOLUTION INTRAMUSCULAR; INTRAVENOUS at 01:41

## 2018-12-11 RX ADMIN — MEGESTROL ACETATE 400 MG: 40 SUSPENSION ORAL at 20:59

## 2018-12-11 RX ADMIN — SODIUM CHLORIDE 1000 ML: 9 INJECTION, SOLUTION INTRAVENOUS at 05:48

## 2018-12-11 RX ADMIN — OXYCODONE HYDROCHLORIDE 5 MG: 5 TABLET ORAL at 22:21

## 2018-12-11 RX ADMIN — Medication 10 ML: at 21:03

## 2018-12-11 RX ADMIN — METOCLOPRAMIDE HYDROCHLORIDE 5 MG: 5 TABLET ORAL at 20:59

## 2018-12-11 RX ADMIN — PANCRELIPASE 1 CAPSULE: 30000; 6000; 19000 CAPSULE, DELAYED RELEASE PELLETS ORAL at 13:02

## 2018-12-11 RX ADMIN — PANCRELIPASE 1 CAPSULE: 30000; 6000; 19000 CAPSULE, DELAYED RELEASE PELLETS ORAL at 17:16

## 2018-12-11 RX ADMIN — OXYCODONE HYDROCHLORIDE 5 MG: 5 TABLET ORAL at 09:41

## 2018-12-11 RX ADMIN — ONDANSETRON 4 MG: 2 INJECTION INTRAMUSCULAR; INTRAVENOUS at 17:19

## 2018-12-11 RX ADMIN — PANTOPRAZOLE SODIUM 40 MG: 40 TABLET, DELAYED RELEASE ORAL at 05:48

## 2018-12-11 RX ADMIN — MEGESTROL ACETATE 400 MG: 40 SUSPENSION ORAL at 09:27

## 2018-12-11 RX ADMIN — SODIUM CHLORIDE 1000 ML: 9 INJECTION, SOLUTION INTRAVENOUS at 03:22

## 2018-12-11 RX ADMIN — Medication 10 ML: at 09:27

## 2018-12-11 RX ADMIN — METOCLOPRAMIDE HYDROCHLORIDE 5 MG: 5 TABLET ORAL at 13:02

## 2018-12-11 ASSESSMENT — ENCOUNTER SYMPTOMS
SINUS PAIN: 0
COUGH: 0
RHINORRHEA: 0
WHEEZING: 0
BACK PAIN: 1
SHORTNESS OF BREATH: 0
CONSTIPATION: 0
PHOTOPHOBIA: 0
ABDOMINAL PAIN: 0
VOMITING: 0
DIARRHEA: 0
NAUSEA: 0
ANAL BLEEDING: 0
STRIDOR: 0
ABDOMINAL DISTENTION: 0
CHEST TIGHTNESS: 0

## 2018-12-11 ASSESSMENT — PAIN DESCRIPTION - ONSET
ONSET: ON-GOING

## 2018-12-11 ASSESSMENT — PAIN DESCRIPTION - FREQUENCY
FREQUENCY: CONTINUOUS

## 2018-12-11 ASSESSMENT — PAIN DESCRIPTION - LOCATION
LOCATION: BACK
LOCATION: BACK;RIB CAGE
LOCATION: BACK

## 2018-12-11 ASSESSMENT — PAIN DESCRIPTION - PAIN TYPE
TYPE: ACUTE PAIN

## 2018-12-11 ASSESSMENT — PAIN DESCRIPTION - PROGRESSION
CLINICAL_PROGRESSION: NOT CHANGED

## 2018-12-11 ASSESSMENT — PAIN DESCRIPTION - ORIENTATION
ORIENTATION: LOWER
ORIENTATION: LOWER;LEFT;MID
ORIENTATION: LOWER

## 2018-12-11 ASSESSMENT — ACTIVITIES OF DAILY LIVING (ADL)
EFFECT OF PAIN ON DAILY ACTIVITIES: DISCOMFORT

## 2018-12-11 ASSESSMENT — PAIN SCALES - GENERAL
PAINLEVEL_OUTOF10: 6
PAINLEVEL_OUTOF10: 0
PAINLEVEL_OUTOF10: 5
PAINLEVEL_OUTOF10: 2
PAINLEVEL_OUTOF10: 1
PAINLEVEL_OUTOF10: 3
PAINLEVEL_OUTOF10: 4
PAINLEVEL_OUTOF10: 5
PAINLEVEL_OUTOF10: 2
PAINLEVEL_OUTOF10: 4
PAINLEVEL_OUTOF10: 3

## 2018-12-11 ASSESSMENT — PAIN DESCRIPTION - DESCRIPTORS
DESCRIPTORS: CONSTANT;DISCOMFORT;TENDER;SORE
DESCRIPTORS: ACHING

## 2018-12-11 NOTE — CONSULTS
MAGNESIA) 400 MG/5ML suspension 30 mL, 30 mL, Oral, Daily PRN  ondansetron (ZOFRAN) injection 4 mg, 4 mg, Intravenous, Q6H PRN  enoxaparin (LOVENOX) injection 40 mg, 40 mg, Subcutaneous, Daily  lipase-protease-amylase (CREON) 6000 units delayed release capsule 1 capsule, 1 capsule, Oral, TID WC  oxyCODONE (ROXICODONE) immediate release tablet 5 mg, 5 mg, Oral, Q4H PRN  0.9 % sodium chloride infusion, 1,000 mL, Intravenous, Continuous    Allergies:  Patient has no known allergies. Social History:    Patient currently lives with family  (wife)    Review of Systems -   General ROS: positive for  - fatigue and weight loss  Psychological ROS: positive for - anxiety and depression  ENT ROS: negative  Hematological and Lymphatic ROS: negative  Respiratory ROS: negative  Cardiovascular ROS: negative  Gastrointestinal ROS: positive for - abdominal pain, appetite loss and nausea/vomiting  Genito-Urinary ROS: negative  Musculoskeletal ROS: positive for - gait disturbance and muscular weakness  Neurological ROS: negative      Objective:        PHYSICAL EXAM:    General appearance: alert, appears older than stated age, cooperative, no distress and pale  Head: Normocephalic, without obvious abnormality, atraumatic  Eyes: negative findings: lids and lashes normal and conjunctivae and sclerae normal  Lungs: clear to auscultation bilaterally  Heart: regular rate and rhythm  Abdomen: round, slightly distended  Extremities: extremities normal, atraumatic, no cyanosis or edema  Neurologic: Grossly normal    Palliative Performance Scale:  60% ? Ambulation reduced; Significant disease; Can't do hobbies/housework; intake normal   or reduced; occasional assist; LOC full/confusion  50% ? Mainly sit/lie; Extensive disease; Can't do any work; Considerable assist; intake normal  Or reduced; LOC full/confusion  40% ? Mainly in bed; Extensive disease; Mainly assist; intake normal or reduced; occasional assist; LOC full/confusion  30% ?  Bed

## 2018-12-11 NOTE — ED NOTES
Pt continues to complain of back pain - explained to pt that we need to be careful with the pain medications due to his blood pressure being low. Pt has wound on left arm/elbow from a fall last week that must have been bumped during his fall tonight - will redress his wound.      Ana Cristina Ochoa RN  12/11/18 0901

## 2018-12-11 NOTE — ED NOTES
Non-skid socks placed on pt and fall risk bracelet on wrist; pt instructed not to get up without assistance. Call light in reach, bed in low position, and side rails up x2.      Robel Rubio RN  12/11/18 1611

## 2018-12-11 NOTE — CARE COORDINATION
Palliative Care Social Work   Date: 2018   Time: 10:54 AM     Patient Name:  Anitha Wang    Room/Bed:  6055/5232-24  YOB: 1955   Sex: Male      Admit Date/Time:  2018 12:06 AM                                  Narrative:    PC updated this morning by oncology resident- Dr. Nelida Mccarthy that prior to admission patient was in the process of getting hospice involved at home (possible [facility]). SW met with patient at bedside, he was A&O. Patient states he wants everything to go thru his wife-Cindy ([phone])  first including code status discussion. Spouse is currently in [place] at her fathers  today. SW left VM with Cindy. Advanced Directives:  Code Status: [address] power of : yes, but not sure where they are   Living Will: yes, not sure where they are    Disposition of Patient:  From home, active with [facility] acute [facility] recently. Possible home with hospice care.      SOLO Hampton/[place]  Palliative Care   [phone]

## 2018-12-11 NOTE — H&P
tablet by mouth nightly     Historical Provider, MD   Cholecalciferol (VITAMIN D3) 5000 UNITS TABS Take 1,000 Units by mouth nightly     Historical Provider, MD       Allergies:  Patient has no known allergies. Social History:      TOBACCO:   reports that he has quit smoking. His smoking use included Cigars and Pipe. He has a 7.50 pack-year smoking history. He has never used smokeless tobacco.  ETOH:  reports that he does not drink alcohol. Family History:     Family History   Problem Relation Age of Onset    Cancer Mother         breast    Asthma Mother     Other Father         perforating gastric ulcer-  age 68    Heart Disease Father     Depression Brother     Substance Abuse Brother     Diabetes Maternal Grandmother     Cancer Maternal Grandfather         colon- dx'd late 66's    Other Brother         atherosclerosis    Cancer Paternal Grandfather        REVIEW OF SYSTEMS: Pertinent positives as noted in the HPI. All other systems reviewed and negative. ROS: Review of Systems   Constitutional: Negative for chills and fever. Respiratory: Negative for chest tightness and shortness of breath. Cardiovascular: Positive for leg swelling. Negative for chest pain. Gastrointestinal: Negative for abdominal pain, nausea and vomiting. Musculoskeletal: Positive for back pain, gait problem and myalgias. Neurological: Negative for dizziness, weakness and headaches. PHYSICAL EXAM PERFORMED:    BP 91/71   Pulse 106   Temp 98.5 °F (36.9 °C) (Oral)   Resp 20   SpO2 100%     Physical Exam   Constitutional: He is oriented to person, place, and time. No distress. Cachectic appearing   HENT:   Head: Normocephalic. Right Ear: External ear normal.   Left Ear: External ear normal.   Eyes: Conjunctivae and EOM are normal.   Neck: Normal range of motion. Neck supple. Cardiovascular: Normal rate and normal heart sounds.     Pulmonary/Chest: Effort normal and breath sounds normal.   Abdominal: Soft. He exhibits no distension. There is no tenderness. Musculoskeletal: Normal range of motion. He exhibits edema and tenderness. He exhibits no deformity. Neurological: He is alert and oriented to person, place, and time. No cranial nerve deficit. Skin: Skin is warm and dry. He is not diaphoretic. Labs:     Recent Labs      12/11/18   0337   WBC  8.1   HGB  7.9*   HCT  23.8*   PLT  87*     Recent Labs      12/11/18   0118   NA  130*   K  3.9   CL  98*   CO2  14*   BUN  17   CREATININE  1.3   CALCIUM  6.8*     Recent Labs      12/11/18   0338   AST  28   ALT  28   BILIDIR  0.7*   BILITOT  1.1*   ALKPHOS  263*     No results for input(s): INR in the last 72 hours. No results for input(s): Bryn Quintana in the last 72 hours. Urinalysis:      Lab Results   Component Value Date    NITRU Negative 10/19/2017    WBCUA 3-5 10/19/2017    BACTERIA Rare 10/19/2017    RBCUA 0-2 10/19/2017    BLOODU Negative 10/19/2017    SPECGRAV 1.010 10/19/2017    GLUCOSEU Negative 10/19/2017    GLUCOSEU NEGATIVE 05/28/2010       Radiology:     XR THORACIC SPINE (2 VIEWS)   Final Result     No compression fracture or subluxation. XR CHEST STANDARD (2 VW)   Final Result     No acute rib fracture. No pneumothorax. ASSESSMENT & PLAN:  Ronaldo Arevalo is a 66 y.o. male w/  PMH of pancreatic cancer with mets to liver (s/p Whipple) and HTN who presents to [facility] after a fall at home. Admitted for pain management. Active Hospital Problems    Diagnosis Date Noted    Back pain [M54.9] 12/11/2018     Back Pain  Patient fell and back along bedside dresser. XR unremarkable. - oxycodone q4h PRN    Failure to Thrive  Patient is deconditioned and patient states he has very poor PO intake and has been losing weight.   - PT/OT   - Palliative care     Hyponatremia   Most likely 2/2 hypovolemic hyponatremia. Patient has poor PO intake and take HCTZ at home for BP management.    - hold HCTZ   - general diet   -

## 2018-12-11 NOTE — ED PROVIDER NOTES
He displays normal reflexes. No cranial nerve deficit. Skin: Skin is warm and dry. Capillary refill takes less than 2 seconds. No rash noted. He is not diaphoretic. No erythema. No pallor. Psychiatric: He has a normal mood and affect. His behavior is normal. Judgment and thought content normal.       DiagnosticResults     RADIOLOGY:  XR THORACIC SPINE (2 VIEWS)    (Results Pending)   XR CHEST STANDARD (2 VW)    (Results Pending)       LABS:   No results found for this visit on 12/11/18. ED BEDSIDE ULTRASOUND:  none    RECENT VITALS:  BP: 89/71, Temp: 98.5 °F (36.9 °C), Pulse: 106,Resp: 20, SpO2: 100 %     Procedures     none    ED Course     Nursing Notes, Past Medical Hx, Past Surgical Hx, Social Hx, Allergies, and Family Hx were reviewed. The patient was given the followingmedications:  Orders Placed This Encounter   Medications    0.9 % sodium chloride bolus    lidocaine 4 % external patch 1 patch     CONSULTS:  None    MEDICAL DECISION MAKING / ASSESSMENT / Annel John is a 66 y.o. male with a PMHx of stage IV pancreatic cancer who presents following a mechanical fall. However, he has felt weak recently and had poor PO intake. Moreover, he has had recent fall that were attributed to syncopal episodes. On presentation he was hypotensive (86/71) and tachycardic (106). A 1 L NS bolus was given and a lidocaine patch for pain relief. Imaging (Chest and thoracic X-ray) and BMP are pending. His recent weakness and falls are likely 2/2 to poor PO intake and generalized deconditioning in the setting of his metastatic pancreatic cancer. This patient was also evaluated by the attending physician. All care plans werediscussed and agreed upon. Clinical Impression     1. Fall, subsequent encounter    2. Pain in thoracic spine    3. Rib pain on left side        Disposition     PATIENT REFERRED TO:  No follow-up provider specified.     DISCHARGE MEDICATIONS:  New Prescriptions    No medications on

## 2018-12-11 NOTE — TELEPHONE ENCOUNTER
Reason for Disposition   Sounds like a life-threatening emergency to the triager    Protocols used: HEAD INJURY-ADULT-AH  Emily Garcia, states he heard Marie Griffin fall in another room. Caller found Marie Griffin on the floor, helped move him to a couch. Marie Griffin states he hit his head, denies LOC. He is more worried about the pain in his back and leg. Ravindra Hua, a friend of the family, is staying with Marie Griffin and feels he is unable to help Marie Griffin to the car. Marie Griffin currently has cancer, is losing weight, thinner. Recommended to call 911 for assistance to the hospital for further evaluation.

## 2018-12-11 NOTE — ED NOTES
Wound redressed with adaptic, gauze and coban. Additional labs drawn and sent from piv line after 5ml waste.      Adria Rob RN  12/11/18 3856

## 2018-12-12 ENCOUNTER — APPOINTMENT (OUTPATIENT)
Dept: ULTRASOUND IMAGING | Age: 63
DRG: 551 | End: 2018-12-12
Payer: COMMERCIAL

## 2018-12-12 DIAGNOSIS — F51.01 PRIMARY INSOMNIA: ICD-10-CM

## 2018-12-12 LAB
ABO/RH: NORMAL
ALBUMIN SERPL-MCNC: 1.1 G/DL (ref 3.4–5)
ALP BLD-CCNC: 281 U/L (ref 40–129)
ALT SERPL-CCNC: 27 U/L (ref 10–40)
ANION GAP SERPL CALCULATED.3IONS-SCNC: 12 MMOL/L (ref 3–16)
ANTIBODY SCREEN: NORMAL
AST SERPL-CCNC: 35 U/L (ref 15–37)
BASOPHILS ABSOLUTE: 0 K/UL (ref 0–0.2)
BASOPHILS RELATIVE PERCENT: 0.1 %
BILIRUB SERPL-MCNC: 0.9 MG/DL (ref 0–1)
BILIRUBIN DIRECT: 0.6 MG/DL (ref 0–0.3)
BILIRUBIN, INDIRECT: 0.3 MG/DL (ref 0–1)
BLOOD BANK DISPENSE STATUS: NORMAL
BLOOD BANK DISPENSE STATUS: NORMAL
BLOOD BANK PRODUCT CODE: NORMAL
BLOOD BANK PRODUCT CODE: NORMAL
BPU ID: NORMAL
BPU ID: NORMAL
BUN BLDV-MCNC: 21 MG/DL (ref 7–20)
CALCIUM SERPL-MCNC: 6.7 MG/DL (ref 8.3–10.6)
CHLORIDE BLD-SCNC: 98 MMOL/L (ref 99–110)
CO2: 17 MMOL/L (ref 21–32)
CREAT SERPL-MCNC: 2 MG/DL (ref 0.8–1.3)
DESCRIPTION BLOOD BANK: NORMAL
DESCRIPTION BLOOD BANK: NORMAL
EOSINOPHILS ABSOLUTE: 0 K/UL (ref 0–0.6)
EOSINOPHILS RELATIVE PERCENT: 0.1 %
GFR AFRICAN AMERICAN: 41
GFR NON-AFRICAN AMERICAN: 34
GLUCOSE BLD-MCNC: 138 MG/DL (ref 70–99)
HCT VFR BLD CALC: 18.8 % (ref 40.5–52.5)
HEMOGLOBIN: 6.3 G/DL (ref 13.5–17.5)
LACTIC ACID: 3 MMOL/L (ref 0.4–2)
LACTIC ACID: 4 MMOL/L (ref 0.4–2)
LYMPHOCYTES ABSOLUTE: 0.4 K/UL (ref 1–5.1)
LYMPHOCYTES RELATIVE PERCENT: 3.4 %
MCH RBC QN AUTO: 35.8 PG (ref 26–34)
MCHC RBC AUTO-ENTMCNC: 33.3 G/DL (ref 31–36)
MCV RBC AUTO: 107.6 FL (ref 80–100)
MONOCYTES ABSOLUTE: 0.7 K/UL (ref 0–1.3)
MONOCYTES RELATIVE PERCENT: 6.5 %
NEUTROPHILS ABSOLUTE: 9.5 K/UL (ref 1.7–7.7)
NEUTROPHILS RELATIVE PERCENT: 89.9 %
PDW BLD-RTO: 14.3 % (ref 12.4–15.4)
PLATELET # BLD: 78 K/UL (ref 135–450)
PMV BLD AUTO: 9.4 FL (ref 5–10.5)
POTASSIUM REFLEX MAGNESIUM: 4.9 MMOL/L (ref 3.5–5.1)
RBC # BLD: 1.75 M/UL (ref 4.2–5.9)
SODIUM BLD-SCNC: 127 MMOL/L (ref 136–145)
TOTAL PROTEIN: 4.2 G/DL (ref 6.4–8.2)
WBC # BLD: 10.5 K/UL (ref 4–11)

## 2018-12-12 PROCEDURE — 86901 BLOOD TYPING SEROLOGIC RH(D): CPT

## 2018-12-12 PROCEDURE — 6360000002 HC RX W HCPCS: Performed by: NURSE PRACTITIONER

## 2018-12-12 PROCEDURE — 0W9G3ZZ DRAINAGE OF PERITONEAL CAVITY, PERCUTANEOUS APPROACH: ICD-10-PCS | Performed by: RADIOLOGY

## 2018-12-12 PROCEDURE — 6370000000 HC RX 637 (ALT 250 FOR IP): Performed by: STUDENT IN AN ORGANIZED HEALTH CARE EDUCATION/TRAINING PROGRAM

## 2018-12-12 PROCEDURE — 86850 RBC ANTIBODY SCREEN: CPT

## 2018-12-12 PROCEDURE — 85025 COMPLETE CBC W/AUTO DIFF WBC: CPT

## 2018-12-12 PROCEDURE — 2709999900 US GUIDED PARACENTESIS

## 2018-12-12 PROCEDURE — 83605 ASSAY OF LACTIC ACID: CPT

## 2018-12-12 PROCEDURE — P9017 PLASMA 1 DONOR FRZ W/IN 8 HR: HCPCS

## 2018-12-12 PROCEDURE — 2580000003 HC RX 258: Performed by: STUDENT IN AN ORGANIZED HEALTH CARE EDUCATION/TRAINING PROGRAM

## 2018-12-12 PROCEDURE — P9016 RBC LEUKOCYTES REDUCED: HCPCS

## 2018-12-12 PROCEDURE — 36592 COLLECT BLOOD FROM PICC: CPT

## 2018-12-12 PROCEDURE — 2060000000 HC ICU INTERMEDIATE R&B

## 2018-12-12 PROCEDURE — 80076 HEPATIC FUNCTION PANEL: CPT

## 2018-12-12 PROCEDURE — 80048 BASIC METABOLIC PNL TOTAL CA: CPT

## 2018-12-12 PROCEDURE — 99233 SBSQ HOSP IP/OBS HIGH 50: CPT | Performed by: NURSE PRACTITIONER

## 2018-12-12 PROCEDURE — 51798 US URINE CAPACITY MEASURE: CPT

## 2018-12-12 PROCEDURE — 36430 TRANSFUSION BLD/BLD COMPNT: CPT

## 2018-12-12 PROCEDURE — 86923 COMPATIBILITY TEST ELECTRIC: CPT

## 2018-12-12 PROCEDURE — 86900 BLOOD TYPING SEROLOGIC ABO: CPT

## 2018-12-12 PROCEDURE — 36415 COLL VENOUS BLD VENIPUNCTURE: CPT

## 2018-12-12 PROCEDURE — 6360000002 HC RX W HCPCS: Performed by: STUDENT IN AN ORGANIZED HEALTH CARE EDUCATION/TRAINING PROGRAM

## 2018-12-12 RX ORDER — PHYTONADIONE 5 MG/1
5 TABLET ORAL ONCE
Status: COMPLETED | OUTPATIENT
Start: 2018-12-12 | End: 2018-12-12

## 2018-12-12 RX ORDER — 0.9 % SODIUM CHLORIDE 0.9 %
250 INTRAVENOUS SOLUTION INTRAVENOUS ONCE
Status: COMPLETED | OUTPATIENT
Start: 2018-12-12 | End: 2018-12-12

## 2018-12-12 RX ORDER — ZOLPIDEM TARTRATE 10 MG/1
TABLET ORAL
Qty: 90 TABLET | Refills: 0 | Status: SHIPPED | OUTPATIENT
Start: 2018-12-12 | End: 2019-03-12

## 2018-12-12 RX ORDER — 0.9 % SODIUM CHLORIDE 0.9 %
250 INTRAVENOUS SOLUTION INTRAVENOUS ONCE
Status: CANCELLED | OUTPATIENT
Start: 2018-12-12 | End: 2018-12-12

## 2018-12-12 RX ORDER — SODIUM CHLORIDE 9 MG/ML
INJECTION, SOLUTION INTRAVENOUS CONTINUOUS
Status: DISCONTINUED | OUTPATIENT
Start: 2018-12-12 | End: 2018-12-12 | Stop reason: SDUPTHER

## 2018-12-12 RX ORDER — PROMETHAZINE HYDROCHLORIDE 25 MG/ML
6.25 INJECTION, SOLUTION INTRAMUSCULAR; INTRAVENOUS EVERY 6 HOURS PRN
Status: DISCONTINUED | OUTPATIENT
Start: 2018-12-12 | End: 2018-12-13 | Stop reason: HOSPADM

## 2018-12-12 RX ADMIN — ATORVASTATIN CALCIUM 40 MG: 40 TABLET, FILM COATED ORAL at 08:10

## 2018-12-12 RX ADMIN — OXYCODONE HYDROCHLORIDE 5 MG: 5 TABLET ORAL at 21:25

## 2018-12-12 RX ADMIN — MEGESTROL ACETATE 400 MG: 40 SUSPENSION ORAL at 21:25

## 2018-12-12 RX ADMIN — SODIUM CHLORIDE 250 ML: 9 INJECTION, SOLUTION INTRAVENOUS at 09:23

## 2018-12-12 RX ADMIN — METOCLOPRAMIDE HYDROCHLORIDE 5 MG: 5 TABLET ORAL at 21:25

## 2018-12-12 RX ADMIN — Medication 10 ML: at 08:15

## 2018-12-12 RX ADMIN — VENLAFAXINE HYDROCHLORIDE 75 MG: 75 CAPSULE, EXTENDED RELEASE ORAL at 09:20

## 2018-12-12 RX ADMIN — PANTOPRAZOLE SODIUM 40 MG: 40 TABLET, DELAYED RELEASE ORAL at 08:10

## 2018-12-12 RX ADMIN — OXYCODONE HYDROCHLORIDE 5 MG: 5 TABLET ORAL at 02:33

## 2018-12-12 RX ADMIN — METOCLOPRAMIDE HYDROCHLORIDE 5 MG: 5 TABLET ORAL at 09:20

## 2018-12-12 RX ADMIN — PROMETHAZINE HYDROCHLORIDE 6.25 MG: 25 INJECTION INTRAMUSCULAR; INTRAVENOUS at 11:22

## 2018-12-12 RX ADMIN — PANCRELIPASE 1 CAPSULE: 30000; 6000; 19000 CAPSULE, DELAYED RELEASE PELLETS ORAL at 09:20

## 2018-12-12 RX ADMIN — MEGESTROL ACETATE 400 MG: 40 SUSPENSION ORAL at 08:10

## 2018-12-12 RX ADMIN — Medication 10 ML: at 21:28

## 2018-12-12 RX ADMIN — PHYTONADIONE 5 MG: 5 TABLET ORAL at 11:24

## 2018-12-12 RX ADMIN — PANCRELIPASE 1 CAPSULE: 30000; 6000; 19000 CAPSULE, DELAYED RELEASE PELLETS ORAL at 17:31

## 2018-12-12 RX ADMIN — OXYCODONE HYDROCHLORIDE 5 MG: 5 TABLET ORAL at 14:43

## 2018-12-12 RX ADMIN — SODIUM CHLORIDE 1000 ML: 9 INJECTION, SOLUTION INTRAVENOUS at 01:41

## 2018-12-12 RX ADMIN — OXYCODONE HYDROCHLORIDE 5 MG: 5 TABLET ORAL at 08:19

## 2018-12-12 RX ADMIN — SODIUM CHLORIDE 1000 ML: 9 INJECTION, SOLUTION INTRAVENOUS at 22:09

## 2018-12-12 RX ADMIN — ONDANSETRON 4 MG: 2 INJECTION INTRAMUSCULAR; INTRAVENOUS at 08:23

## 2018-12-12 RX ADMIN — ONDANSETRON 4 MG: 2 INJECTION INTRAMUSCULAR; INTRAVENOUS at 02:49

## 2018-12-12 ASSESSMENT — PAIN SCALES - GENERAL
PAINLEVEL_OUTOF10: 0
PAINLEVEL_OUTOF10: 0
PAINLEVEL_OUTOF10: 7
PAINLEVEL_OUTOF10: 3
PAINLEVEL_OUTOF10: 4
PAINLEVEL_OUTOF10: 0
PAINLEVEL_OUTOF10: 4
PAINLEVEL_OUTOF10: 4

## 2018-12-12 ASSESSMENT — ACTIVITIES OF DAILY LIVING (ADL)
EFFECT OF PAIN ON DAILY ACTIVITIES: COMFORT

## 2018-12-12 ASSESSMENT — PAIN DESCRIPTION - ONSET
ONSET: ON-GOING

## 2018-12-12 ASSESSMENT — PAIN DESCRIPTION - LOCATION
LOCATION: BACK

## 2018-12-12 ASSESSMENT — PAIN DESCRIPTION - PAIN TYPE
TYPE: ACUTE PAIN

## 2018-12-12 ASSESSMENT — PAIN DESCRIPTION - FREQUENCY
FREQUENCY: CONTINUOUS

## 2018-12-12 ASSESSMENT — PAIN DESCRIPTION - ORIENTATION
ORIENTATION: LOWER
ORIENTATION: LOWER

## 2018-12-12 ASSESSMENT — PAIN DESCRIPTION - PROGRESSION
CLINICAL_PROGRESSION: NOT CHANGED

## 2018-12-12 ASSESSMENT — PAIN DESCRIPTION - DESCRIPTORS
DESCRIPTORS: ACHING

## 2018-12-12 NOTE — DISCHARGE SUMMARY
Hospital Medicine Discharge Summary    Patient ID: Nitish Oakes   Gender: male  : 1955   Age: 66 y.o. MRN: [id number]  Code Status: DNR-CC    Patient's PCP: Nehemiah Freedman MD    Admit Date: 2018     Discharge Date:   2018    Admitting Physician: Noreen Agudelo MD     Discharge Physician: Miley Saunders DO     Discharge Diagnoses: Active Hospital Problems    Diagnosis Date Noted    Encounter for hospice care discussion [Z71.89]     Back pain [M54.9] 2018    Severe malnutrition ([facility] [address] [E43] 2018    Generalized pain [R52]     Nausea [R11.0]     Malignant neoplasm of pancreas ([facility] [address] [facility] [C25.9]     Goals of care, counseling/discussion [Z71.89]     DNR (do not resuscitate) discussion [Z71.89]     Encounter for palliative care [Z51.5]        The patient was seen and examined on day of discharge and this discharge summary is in conjunction with any daily progress note from day of discharge. Hospital Course: Duke Santo is a 67 yo male with a PMHx significant for stage 4 pancreatic cancer with metastasis to the liver s/p whipple's procedure. Patient presented to [facility] ED from home after falling and hitting his back on a nightstand. Patient had Xrays of the back and head that were negative for any fractures. Patient was treated with pain medications and his pain was well controlled. Patient was found to have an elevated INR and low Hemoglobin in the hospital and was given 2 units FFP and 1 unit PRBCs. Patient has a history of ascites and has needed paracentesis, while in the hospital a paracentesis was performed by IR after INR was corrected. Prior to hospitalization patient was speaking with [facility] due to terminal cancer diagnosis. When patient was admitted palliative care was consulted and patient was put into contact with the hospice agency.  After discussing options with palliative, oncology and hospice the patient and his wife decided to enroll patient

## 2018-12-12 NOTE — PROGRESS NOTES
4 Eyes Admission Assessment     I agree as the admission nurse that 2 RN's have performed a thorough Head to Toe Skin Assessment on the patient. ALL assessment sites listed below have been assessed on admission. Areas assessed by both nurses: [place] [employer]  [x]   Head, Face, and Ears   [x]   Shoulders, Back, and Chest  [x]   Arms, Elbows, and Hands   [x]   Coccyx, Sacrum, and Ischum  [x]   Legs, Feet, and Heels        Does the Patient have Skin Breakdown? Yes a wound was noted on the Admission Assessment and an LDA was Initiated documentation include the Corrie-wound, Wound Assessment, Measurements, Dressing Treatment, Drainage, and Color\", Right eye abrasion, Chin bruise, Left elbow abrasion, Left forearm abrasion, Back Left Rib cage bruise/ abrasion, Left leg scab/ bruise, Right knee abrasion and shin abrasion.          Zion Prevention initiated:  Yes   Wound Care Orders initiated:  Yes      [address]  nurse consulted for Pressure Injury (Stage 3,4, Unstageable, DTI, NWPT, and Complex wounds):  Yes      Nurse 1 eSignature: Electronically signed by Jordon Brown RN on 12/12/18 at 12:49 AM    **SHARE this note so that the co-signing nurse is able to place an eSignature**    Nurse 2 eSignature: {Esignature:[id number]}
Patient Vitals for the past 8 hrs:   BP Temp Temp src Pulse Resp SpO2 Weight   12/12/18 1126 96/70 98.5 °F (36.9 °C) Temporal 110 15 100 % -   12/12/18 0859 - - - - - - 150 lb (68 kg)   12/12/18 0846 - 98.1 °F (36.7 °C) Oral 99 14 100 % -   12/12/18 0827 98/63 97.6 °F (36.4 °C) Oral 103 14 100 % -   12/12/18 0749 98/63 97.6 °F (36.4 °C) Oral 103 14 100 % -     I/O last 3 completed shifts: In: 200 [P. O.:600; I.V.:2784]  Out: 1 [Emesis/NG output:1]  I/O this shift:  In: -   Out: 250 [Urine:250]    General appearance: alert, appears stated age, cooperative and mild distress  Head: Normocephalic, without obvious abnormality, atraumatic  Eyes: negative findings: lids and lashes normal, conjunctivae and sclerae normal and pupils equal, round, reactive to light and accomodation  Lungs: clear to auscultation bilaterally  Heart: regular rate and rhythm, S1, S2 normal, no murmur, click, rub or gallop  Abdomen: round, slightly distended  Extremities: edema 2+ BLE  Neurologic: Grossly normal    PPS Score: 40%    WBC/Hgb/Hct/Plts:  10.5/6.3/18.8/78 (12/12 0307)           Assessment:     Active Problems:    Back pain    Severe malnutrition (HCC)    Generalized pain    Nausea    Malignant neoplasm of pancreas (HCC)    Goals of care, counseling/discussion    DNR (do not resuscitate) discussion    Encounter for palliative care  Resolved Problems:    * No resolved hospital problems. *      Time spent with patient and/or family:30  Time reviewing records:5  Time communicating with providers:5    A total of 40 minutes spent with the patient and family on unit greater than 50% face to face time in counseling regarding palliative care and goals of care for the patient. Thank you for allowing the  Palliative Care team to participate in this patient's care, will continue to follow Mr. Alvares's as needed.      Loyce Bamberger, APRN/CNP  Palliative Care  [phone]
history of poor intake, Patient report of, Weight loss    Objective Information:  · Nutrition-Focused Physical Findings: appears frail; +3 pitting edema BLE; +BM 12/10   · Wound Type: None  · Current Nutrition Therapies:  · Oral Diet Orders: General   · Oral Diet intake: Unable to assess (None recorded yet )  · Oral Nutrition Supplement (ONS) Orders: None  · Anthropometric Measures:  · Ht: 5' 10\" (177.8 cm)   · Current Body Wt: 150 lb 9.2 oz (68.3 kg)  · Admission Body Wt: 150 lb 9.2 oz (68.3 kg)  · Usual Body Wt: 175 lb (79.4 kg)  · % Weight Change:  ,  No significant weight loss noted per epic x 1 year-  edema may be masking weight loss  · Ideal Body Wt: 166 lb (75.3 kg)   · BMI Classification: BMI 18.5 - 24.9 Normal Weight    Nutrition Interventions:   Continue current diet, Start ONS  Continued Inpatient Monitoring    Nutrition Evaluation:   · Evaluation: Goals set   · Goals: pt will tolerate and consume 50% or more of all meals and supplements offered     · Monitoring: Meal Intake, Supplement Intake, Diet Tolerance, Weight (edema )      Electronically signed by Nicholas Murray RD, LD on 12/11/18 at 9:07 AM    LYNDSEY PALMA, YSABEL  Pager:  [phone]  Office:  [phone]
medical decisions, she is currently out of town and should be returning today   - Patient had been in talks with [facility] for possible enrollment  - oncology following   - Patient has not wanted therapy treatments with the progression of his disease  - Ascites 2/2 liver mets   - IR paracentesis for drainage    Back Pain  Patient fell and back along bedside dresser. No fracture seen on Xray  - oxycodone q4h PRN     Malnutrition - in the setting of terminal illness, most likely cause of weakness and recent falls  Patient is deconditioned and patient states he has very poor PO intake and has been losing weight.   - PT/OT   - Palliative care  - reglan, Megace, protonix      Hyponatremia   Most likely 2/2 hypovolemic hyponatremia. Patient has poor PO intake and take HCTZ at home for BP management.    - hold HCTZ   - general diet   - continue IVFs      PsuedoHypocalcemia   - calcium gluconate given in ED  - Albumin 1.7 with correction Ca is 8.5       Anxiety/Depression  - effexor     Others  - ambien   - pharmacy consult for med    DVT Prophylaxis: lovenox  Diet: DIET GENERAL;  Dietary Nutrition Supplements: Standard High Calorie Oral Supplement  Dietary Nutrition Supplements: Frozen Oral Supplement  Code Status: Full Code    PT/OT EvalStatus: pending    Dispo - General Medical floors    I will discuss the patient with the senior resident and MD Oma Bowen DO   Internal Medicine Resident PGY-1  Reach me via [address] [place] [address]

## 2018-12-12 NOTE — DISCHARGE INSTR - COC
Problems:  Patient Active Problem List   Diagnosis Code    Biliary dyskinesia K82.8    Sprain of medial collateral ligament of left knee S83.412A    Acute pain of right knee M25.561    Meniscus, medial, derangement M23.305    Hypertension, essential I10    Hyperlipidemia, mixed E78.2    GERD (gastroesophageal reflux disease) without esophagitis K21.9    SELINA (generalized anxiety disorder) F41.1    Insomnia, psychophysiological F51.04    Hyperbilirubinemia E80.6    Pancreatic cancer metastasized to liver (HCC) C25.9, C78.7    Malignant neoplasm of head of pancreas (HCC) C25.0    SBO (small bowel obstruction) (HCC) K56.609    Moderate malnutrition (HCC) E44.0    Cachexia (HCC) R64    Major depressive disorder without prior episode, current, moderate (HCC) F32.1    Dyspnea R06.00    PE (pulmonary thromboembolism) ([facility] [address] with pulmonary infarcts, likely hypercoagulable state from suspected recurrent pancreatic cancer I26.99    DVT, bilateral lower limbs (HCC) I82.403    Liver metastases (HCC) C78.7    Back pain M54.9    Severe malnutrition (HCC) E43    Generalized pain R52    Nausea R11.0    Malignant neoplasm of pancreas (HCC) C25.9    Goals of care, counseling/discussion Z71.89    DNR (do not resuscitate) discussion Z70.80    Encounter for palliative care Z51.5       Isolation/Infection:   Isolation          No Isolation            Nurse Assessment:  Last Vital Signs: BP 96/70   Pulse 110   Temp 98.5 °F (36.9 °C) (Temporal)   Resp 15   Ht 5' 10\" (1.778 m)   Wt 150 lb (68 kg)   SpO2 100%   BMI 21.52 kg/m²     Last documented pain score (0-10 scale): Pain Level: 0  Last Weight:   Wt Readings from Last 1 Encounters:   18 150 lb (68 kg)     Mental Status:  {IP PT MENTAL STATUS:}    IV Access:  {[facility] IV ACCESS:[id number]}    Nursing Mobility/ADLs:  Walking   {CHP DME VDFX:[id number]}  Transfer  {CHP DME HBP}  Bathing  {CHP DME HKNK:[id number]}  Dressing  {CHP DME

## 2018-12-13 VITALS
RESPIRATION RATE: 16 BRPM | BODY MASS INDEX: 21.47 KG/M2 | HEART RATE: 95 BPM | OXYGEN SATURATION: 100 % | TEMPERATURE: 98 F | HEIGHT: 70 IN | WEIGHT: 150 LBS | DIASTOLIC BLOOD PRESSURE: 58 MMHG | SYSTOLIC BLOOD PRESSURE: 88 MMHG

## 2018-12-13 LAB
ANION GAP SERPL CALCULATED.3IONS-SCNC: 9 MMOL/L (ref 3–16)
BASOPHILS ABSOLUTE: 0 K/UL (ref 0–0.2)
BASOPHILS RELATIVE PERCENT: 0.1 %
BLOOD BANK DISPENSE STATUS: NORMAL
BLOOD BANK PRODUCT CODE: NORMAL
BPU ID: NORMAL
BUN BLDV-MCNC: 27 MG/DL (ref 7–20)
CALCIUM SERPL-MCNC: 6.7 MG/DL (ref 8.3–10.6)
CHLORIDE BLD-SCNC: 100 MMOL/L (ref 99–110)
CO2: 20 MMOL/L (ref 21–32)
CREAT SERPL-MCNC: 2.1 MG/DL (ref 0.8–1.3)
DESCRIPTION BLOOD BANK: NORMAL
EOSINOPHILS ABSOLUTE: 0 K/UL (ref 0–0.6)
EOSINOPHILS RELATIVE PERCENT: 0.2 %
GFR AFRICAN AMERICAN: 39
GFR NON-AFRICAN AMERICAN: 32
GLUCOSE BLD-MCNC: 102 MG/DL (ref 70–99)
HCT VFR BLD CALC: 17 % (ref 40.5–52.5)
HEMOGLOBIN: 5.8 G/DL (ref 13.5–17.5)
LYMPHOCYTES ABSOLUTE: 0.3 K/UL (ref 1–5.1)
LYMPHOCYTES RELATIVE PERCENT: 3.5 %
MCH RBC QN AUTO: 34.2 PG (ref 26–34)
MCHC RBC AUTO-ENTMCNC: 34.1 G/DL (ref 31–36)
MCV RBC AUTO: 100.5 FL (ref 80–100)
MONOCYTES ABSOLUTE: 0.8 K/UL (ref 0–1.3)
MONOCYTES RELATIVE PERCENT: 7.8 %
NEUTROPHILS ABSOLUTE: 8.8 K/UL (ref 1.7–7.7)
NEUTROPHILS RELATIVE PERCENT: 88.4 %
PDW BLD-RTO: 18.5 % (ref 12.4–15.4)
PLATELET # BLD: 71 K/UL (ref 135–450)
PMV BLD AUTO: 9.3 FL (ref 5–10.5)
POTASSIUM REFLEX MAGNESIUM: 4.6 MMOL/L (ref 3.5–5.1)
RBC # BLD: 1.69 M/UL (ref 4.2–5.9)
SODIUM BLD-SCNC: 129 MMOL/L (ref 136–145)
WBC # BLD: 10 K/UL (ref 4–11)

## 2018-12-13 PROCEDURE — 2580000003 HC RX 258: Performed by: STUDENT IN AN ORGANIZED HEALTH CARE EDUCATION/TRAINING PROGRAM

## 2018-12-13 PROCEDURE — 86923 COMPATIBILITY TEST ELECTRIC: CPT

## 2018-12-13 PROCEDURE — 85025 COMPLETE CBC W/AUTO DIFF WBC: CPT

## 2018-12-13 PROCEDURE — P9040 RBC LEUKOREDUCED IRRADIATED: HCPCS

## 2018-12-13 PROCEDURE — 36430 TRANSFUSION BLD/BLD COMPNT: CPT

## 2018-12-13 PROCEDURE — 36592 COLLECT BLOOD FROM PICC: CPT

## 2018-12-13 PROCEDURE — 6360000002 HC RX W HCPCS: Performed by: STUDENT IN AN ORGANIZED HEALTH CARE EDUCATION/TRAINING PROGRAM

## 2018-12-13 PROCEDURE — 6370000000 HC RX 637 (ALT 250 FOR IP): Performed by: STUDENT IN AN ORGANIZED HEALTH CARE EDUCATION/TRAINING PROGRAM

## 2018-12-13 PROCEDURE — 80048 BASIC METABOLIC PNL TOTAL CA: CPT

## 2018-12-13 RX ORDER — OXYCODONE HCL 20 MG/ML
10 CONCENTRATE, ORAL ORAL EVERY 4 HOURS PRN
Qty: 1 BOTTLE | Refills: 0 | Status: SHIPPED | OUTPATIENT
Start: 2018-12-13 | End: 2018-12-16

## 2018-12-13 RX ORDER — ATROPINE SULFATE 10 MG/ML
SOLUTION/ DROPS OPHTHALMIC
Qty: 10 ML | Refills: 0 | Status: SHIPPED | OUTPATIENT
Start: 2018-12-13

## 2018-12-13 RX ORDER — LORAZEPAM 2 MG/ML
1 CONCENTRATE ORAL EVERY 4 HOURS PRN
Qty: 1 BOTTLE | Refills: 0 | Status: SHIPPED | OUTPATIENT
Start: 2018-12-13 | End: 2018-12-27

## 2018-12-13 RX ORDER — 0.9 % SODIUM CHLORIDE 0.9 %
250 INTRAVENOUS SOLUTION INTRAVENOUS ONCE
Status: DISCONTINUED | OUTPATIENT
Start: 2018-12-13 | End: 2018-12-13 | Stop reason: HOSPADM

## 2018-12-13 RX ORDER — HEPARIN SODIUM (PORCINE) LOCK FLUSH IV SOLN 100 UNIT/ML 100 UNIT/ML
500 SOLUTION INTRAVENOUS
Status: COMPLETED | OUTPATIENT
Start: 2018-12-13 | End: 2018-12-13

## 2018-12-13 RX ADMIN — METOCLOPRAMIDE HYDROCHLORIDE 5 MG: 5 TABLET ORAL at 08:07

## 2018-12-13 RX ADMIN — PANCRELIPASE 1 CAPSULE: 30000; 6000; 19000 CAPSULE, DELAYED RELEASE PELLETS ORAL at 13:06

## 2018-12-13 RX ADMIN — Medication 500 UNITS: at 14:23

## 2018-12-13 RX ADMIN — PANCRELIPASE 1 CAPSULE: 30000; 6000; 19000 CAPSULE, DELAYED RELEASE PELLETS ORAL at 07:56

## 2018-12-13 RX ADMIN — VENLAFAXINE HYDROCHLORIDE 75 MG: 75 CAPSULE, EXTENDED RELEASE ORAL at 08:07

## 2018-12-13 RX ADMIN — METOCLOPRAMIDE HYDROCHLORIDE 5 MG: 5 TABLET ORAL at 13:35

## 2018-12-13 RX ADMIN — Medication 10 ML: at 08:12

## 2018-12-13 RX ADMIN — PANTOPRAZOLE SODIUM 40 MG: 40 TABLET, DELAYED RELEASE ORAL at 07:35

## 2018-12-13 RX ADMIN — MEGESTROL ACETATE 400 MG: 40 SUSPENSION ORAL at 08:07

## 2018-12-13 RX ADMIN — SODIUM CHLORIDE 250 ML: 9 INJECTION, SOLUTION INTRAVENOUS at 13:05

## 2018-12-13 RX ADMIN — ATORVASTATIN CALCIUM 40 MG: 40 TABLET, FILM COATED ORAL at 08:07

## 2018-12-13 RX ADMIN — ENOXAPARIN SODIUM 40 MG: 40 INJECTION SUBCUTANEOUS at 11:48

## 2018-12-13 ASSESSMENT — PAIN SCALES - GENERAL
PAINLEVEL_OUTOF10: 0

## 2018-12-13 NOTE — PLAN OF CARE
Problem: Falls - Risk of:  Goal: Will remain free from falls  Will remain free from falls   Outcome: Ongoing    Pt with activity orders for up with assist.  Bed alarm on. Call light within reach. Encouraged pt to be up OOB as much as possible throughout the day and for all meals. Encouraged frequent short naps as necessary to preserve energy but instructed that while awake, pt should be OOB. Will continue to encourage frequent activity. Problem: Pain:  Goal: Pain level will decrease  Pain level will decrease   Outcome: Met This Shift  Pt denies pain at this time. Encourage pt to call out with any needs or status changes. Will monitor. Problem: Risk for Impaired Skin Integrity  Goal: Tissue integrity - skin and mucous membranes  Structural intactness and normal physiological function of skin and  mucous membranes. Outcome: Ongoing  Pt continues with generalized bruising. Pt has many skin integrity sites.  See assessments for details.  Pt's bandages have been changed and they remain clean, dry, and intact.   Will continue to assess pts skin integrity. Problem: Mental Status - Impaired:  Goal: Mental status will improve  Mental status will improve   Outcome: Met This Shift  Pt orientated x three today. Pt verbalize not sure which hotel he is in. Notified patient he is in hospital. Pt verbalize he remembers now. Pt to go home hospice with [facility]. Will monitor.

## 2018-12-13 NOTE — CARE COORDINATION
Palliative Care Social Work   Date: 12/13/2018   Time: 1:40 PM     Patient Name:  Baby Ion    Room/Bed:  2334/0546-11  YOB: 1955   Sex: Male      Admit Date/Time:  12/11/2018 12:06 AM                                  Narrative:  PCSW met with the patient and his wife. Confirmed that the DME has been delivered to the home. Gave pt's wife with a copy of DNR-CC for her records. Provided emotional support. The patient confirmed that he is feeling much better since his paracentesis. PCSW spoke with Ofelia Ray from [facility] who confirmed that transport has been scheduled for 3pm this afternoon. Advanced Directives:  Code Status:DNR-CC  Healthcare power of : Alan Coreas (wife) [phone]     Disposition of Patient:  Discharge home with [facility]. SW intervention complete.     SOLO Rodriguez, LUISA-S  Palliative Care   [phone]

## 2018-12-16 LAB
BLOOD BANK DISPENSE STATUS: NORMAL
BLOOD BANK DISPENSE STATUS: NORMAL
BLOOD BANK PRODUCT CODE: NORMAL
BLOOD BANK PRODUCT CODE: NORMAL
BPU ID: NORMAL
BPU ID: NORMAL
DESCRIPTION BLOOD BANK: NORMAL
DESCRIPTION BLOOD BANK: NORMAL

## 2018-12-27 ENCOUNTER — TELEPHONE (OUTPATIENT)
Dept: INTERNAL MEDICINE CLINIC | Age: 63
End: 2018-12-27